# Patient Record
Sex: FEMALE | Race: WHITE | NOT HISPANIC OR LATINO | Employment: OTHER | URBAN - METROPOLITAN AREA
[De-identification: names, ages, dates, MRNs, and addresses within clinical notes are randomized per-mention and may not be internally consistent; named-entity substitution may affect disease eponyms.]

---

## 2017-06-03 ENCOUNTER — HOSPITAL ENCOUNTER (EMERGENCY)
Facility: HOSPITAL | Age: 62
Discharge: HOME | End: 2017-06-03
Attending: EMERGENCY MEDICINE
Payer: MEDICAID

## 2017-06-03 VITALS
OXYGEN SATURATION: 99 % | RESPIRATION RATE: 14 BRPM | WEIGHT: 223.31 LBS | HEIGHT: 63 IN | TEMPERATURE: 99 F | DIASTOLIC BLOOD PRESSURE: 69 MMHG | HEART RATE: 64 BPM | SYSTOLIC BLOOD PRESSURE: 141 MMHG | BODY MASS INDEX: 39.57 KG/M2

## 2017-06-03 DIAGNOSIS — R06.00 DYSPNEA, UNSPECIFIED TYPE: Primary | ICD-10-CM

## 2017-06-03 LAB
ALBUMIN SERPL BCP-MCNC: 3.8 G/DL
ALP SERPL-CCNC: 64 U/L
ALT SERPL W/O P-5'-P-CCNC: 34 U/L
ANION GAP SERPL CALC-SCNC: 11 MMOL/L
AST SERPL-CCNC: 19 U/L
BASOPHILS # BLD AUTO: 0 K/UL
BASOPHILS NFR BLD: 0.4 %
BILIRUB SERPL-MCNC: 0.3 MG/DL
BNP SERPL-MCNC: 28 PG/ML
BUN SERPL-MCNC: 15 MG/DL
CALCIUM SERPL-MCNC: 9.4 MG/DL
CHLORIDE SERPL-SCNC: 103 MMOL/L
CO2 SERPL-SCNC: 28 MMOL/L
CREAT SERPL-MCNC: 0.8 MG/DL
DIFFERENTIAL METHOD: ABNORMAL
EOSINOPHIL # BLD AUTO: 0.1 K/UL
EOSINOPHIL NFR BLD: 1.3 %
ERYTHROCYTE [DISTWIDTH] IN BLOOD BY AUTOMATED COUNT: 14.2 %
EST. GFR  (AFRICAN AMERICAN): >60 ML/MIN/1.73 M^2
EST. GFR  (NON AFRICAN AMERICAN): >60 ML/MIN/1.73 M^2
GLUCOSE SERPL-MCNC: 155 MG/DL
HCT VFR BLD AUTO: 38.7 %
HGB BLD-MCNC: 13.2 G/DL
LYMPHOCYTES # BLD AUTO: 1.5 K/UL
LYMPHOCYTES NFR BLD: 17.9 %
MCH RBC QN AUTO: 30 PG
MCHC RBC AUTO-ENTMCNC: 34 %
MCV RBC AUTO: 88 FL
MONOCYTES # BLD AUTO: 0.7 K/UL
MONOCYTES NFR BLD: 8.1 %
NEUTROPHILS # BLD AUTO: 5.9 K/UL
NEUTROPHILS NFR BLD: 72.3 %
PLATELET # BLD AUTO: 183 K/UL
PMV BLD AUTO: 8.1 FL
POTASSIUM SERPL-SCNC: 3.6 MMOL/L
PROT SERPL-MCNC: 7.3 G/DL
RBC # BLD AUTO: 4.38 M/UL
SODIUM SERPL-SCNC: 142 MMOL/L
TROPONIN I SERPL DL<=0.01 NG/ML-MCNC: 0.01 NG/ML
WBC # BLD AUTO: 8.1 K/UL

## 2017-06-03 PROCEDURE — 36415 COLL VENOUS BLD VENIPUNCTURE: CPT

## 2017-06-03 PROCEDURE — 83880 ASSAY OF NATRIURETIC PEPTIDE: CPT

## 2017-06-03 PROCEDURE — 84484 ASSAY OF TROPONIN QUANT: CPT

## 2017-06-03 PROCEDURE — 99284 EMERGENCY DEPT VISIT MOD MDM: CPT

## 2017-06-03 PROCEDURE — 80053 COMPREHEN METABOLIC PANEL: CPT

## 2017-06-03 PROCEDURE — 85025 COMPLETE CBC W/AUTO DIFF WBC: CPT

## 2017-06-03 PROCEDURE — 93005 ELECTROCARDIOGRAM TRACING: CPT

## 2017-06-03 RX ORDER — AMOXICILLIN AND CLAVULANATE POTASSIUM 875; 125 MG/1; MG/1
1 TABLET, FILM COATED ORAL 2 TIMES DAILY
Qty: 20 TABLET | Refills: 0 | Status: SHIPPED | OUTPATIENT
Start: 2017-06-03 | End: 2017-06-13

## 2017-06-03 NOTE — ED PROVIDER NOTES
"Encounter Date: 6/3/2017    SCRIBE #1 NOTE: Michael MENDOZA, constantino scribing for, and in the presence of, Dr. Renteria.       History     Chief Complaint   Patient presents with    Shortness of Breath     cough    Dizziness     Review of patient's allergies indicates:   Allergen Reactions    Iodinated contrast media - oral and iv dye Itching and Other (See Comments)     Throat itching     Vancomycin analogues Itching    Sulfa (sulfonamide antibiotics) Itching    Pcn [penicillins] Other (See Comments)     Bumps in mouth after taking for a while.      06/03/2017  5:53 PM     Chief Complaint: Dizziness and light-headedness      The patient is a 61 y.o. female with a PMHx of cancer and CAD who is presenting with an acute onset of dizziness and light-headedness that started this morning when she woke up. Associated symptom of nausea. Pt has c/o SOB and a productive cough with an associated 10 lb weight gain since over 2 wks ago. Pt seen by Dr. Vivas 2 wks ago and diagnosed with bronchial PNA based on CXR. She was prescribed a Z Talha initially, which did not provide relief. Pt stated Dr. Vivas's office canceled her f/u appointment and Dr. Vivas called in Biaxin and an inhaler for her. She reported not taking the Biaxin "because she read the side effects." Pt used the inhaler, which provided some relief. Her SOB worsened this morning when she woke up. No fever. No CP. No vomiting or blood in stool. Pt has a past surgical history that includes Cardiac surgery; Hysterectomy; Cholecystectomy; Breast surgery; and Knee cartilage surgery.        The history is provided by the patient.     Past Medical History:   Diagnosis Date    Cancer     Breast    Coronary artery disease      Past Surgical History:   Procedure Laterality Date    BREAST SURGERY      CARDIAC SURGERY      CHOLECYSTECTOMY      HYSTERECTOMY      KNEE CARTILAGE SURGERY       History reviewed. No pertinent family history.  Social History   Substance " Use Topics    Smoking status: Former Smoker     Quit date: 6/3/2005    Smokeless tobacco: Not on file    Alcohol use Not on file     Review of Systems   Constitutional: Positive for unexpected weight change (10 lb weight gain.). Negative for fever.   HENT: Negative for sore throat.    Eyes: Negative for visual disturbance.   Respiratory: Positive for cough and shortness of breath.    Cardiovascular: Negative for chest pain.   Gastrointestinal: Positive for nausea. Negative for blood in stool and vomiting.   Genitourinary: Negative for dysuria.   Musculoskeletal: Negative for back pain.   Skin: Negative for rash.   Neurological: Positive for dizziness and light-headedness.   Hematological: Does not bruise/bleed easily.   Psychiatric/Behavioral: Negative for confusion.       Physical Exam     Initial Vitals [06/03/17 1632]   BP Pulse Resp Temp SpO2   136/67 72 14 98.5 °F (36.9 °C) 97 %     Physical Exam    Nursing note and vitals reviewed.  Constitutional: She appears well-developed and well-nourished. She is not diaphoretic. No distress.   HENT:   Head: Normocephalic and atraumatic.   Eyes: EOM are normal. Pupils are equal, round, and reactive to light.   Neck: Normal range of motion. Neck supple.   Cardiovascular: Normal rate, regular rhythm, normal heart sounds and intact distal pulses.   Pulmonary/Chest: Breath sounds normal. No respiratory distress. She has no wheezes. She has no rhonchi. She has no rales.   Abdominal: Soft. Bowel sounds are normal. There is no tenderness. There is no rebound and no guarding.   Musculoskeletal: Normal range of motion.   Neurological: She is alert and oriented to person, place, and time.   Skin: Skin is warm and dry.   Psychiatric: She has a normal mood and affect. Her behavior is normal. Judgment and thought content normal.         ED Course   Procedures  Labs Reviewed - No data to display          Medical Decision Making:   Initial Assessment:   61-year-old female presented  with a chief complaint of shortness of breath and dizziness.  Differential Diagnosis:   Differential diagnosis includes PNA, CHF, acute MI, and bronchitis.  Clinical Tests:   Lab Tests: Ordered and Reviewed  Radiological Study: Ordered and Reviewed  Medical Tests: Ordered and Reviewed  ED Management:  The patient was emergently evaluated in the ED, her evaluation was significant for a middle-aged female with a normal lung exam.  The patient's EKG showed no acute abnormalities per my independent interpretation.  The patient's x-ray showed no acute abnormalities per my independent interpretation.  The patient's CT scan of her chest showed no acute abnormalities.  The patient's labs showed no acute processes, including a negative troponin.  The etiology patient's dyspnea could be a sinus infection.  She is stable for discharge to home.  She'll be discharged home with by mouth Augmentin and she is to follow-up with her PCP for further care.            Scribe Attestation:   Scribe #1: I performed the above scribed service and the documentation accurately describes the services I performed. I attest to the accuracy of the note.    Attending Attestation:           Physician Attestation for Scribe:  Physician Attestation Statement for Scribe #1: I, Dr. Renteria, reviewed documentation, as scribed by Michael Lima in my presence, and it is both accurate and complete.                 ED Course     Clinical Impression:   The encounter diagnosis was Dyspnea, unspecified type.          Jeremiah Renteria MD  06/03/17 7012

## 2017-07-28 ENCOUNTER — OFFICE VISIT (OUTPATIENT)
Dept: SURGERY | Facility: CLINIC | Age: 62
End: 2017-07-28
Payer: MEDICAID

## 2017-07-28 VITALS
BODY MASS INDEX: 39.51 KG/M2 | DIASTOLIC BLOOD PRESSURE: 69 MMHG | SYSTOLIC BLOOD PRESSURE: 141 MMHG | HEIGHT: 63 IN | WEIGHT: 223 LBS

## 2017-07-28 DIAGNOSIS — K44.9 DIAPHRAGMATIC HERNIA WITHOUT OBSTRUCTION AND WITHOUT GANGRENE: ICD-10-CM

## 2017-07-28 PROCEDURE — 99203 OFFICE O/P NEW LOW 30 MIN: CPT | Mod: ,,, | Performed by: SURGERY

## 2017-07-28 RX ORDER — ALENDRONATE SODIUM 70 MG/1
TABLET ORAL
COMMUNITY
Start: 2017-05-15 | End: 2020-05-11

## 2017-07-28 RX ORDER — ERGOCALCIFEROL 1.25 MG/1
CAPSULE ORAL
COMMUNITY
Start: 2017-05-15 | End: 2020-05-11

## 2017-07-28 RX ORDER — CLARITHROMYCIN 500 MG/1
TABLET, FILM COATED ORAL
COMMUNITY
Start: 2017-05-22 | End: 2018-06-21

## 2017-07-28 RX ORDER — ATORVASTATIN CALCIUM 20 MG/1
TABLET, FILM COATED ORAL
COMMUNITY
Start: 2017-05-15 | End: 2020-05-11

## 2017-07-28 RX ORDER — ALBUTEROL SULFATE 90 UG/1
AEROSOL, METERED RESPIRATORY (INHALATION)
COMMUNITY
Start: 2017-05-22 | End: 2018-11-26 | Stop reason: SDUPTHER

## 2017-07-28 RX ORDER — FLUOCINONIDE 0.5 MG/G
CREAM TOPICAL
COMMUNITY
Start: 2017-05-15 | End: 2018-06-21

## 2017-07-28 RX ORDER — AZITHROMYCIN 250 MG/1
TABLET, FILM COATED ORAL
COMMUNITY
Start: 2017-05-18 | End: 2018-06-21 | Stop reason: ALTCHOICE

## 2017-07-28 RX ORDER — MECLIZINE HCL 12.5 MG 12.5 MG/1
TABLET ORAL
COMMUNITY
Start: 2017-06-05 | End: 2018-06-21

## 2017-07-28 RX ORDER — LISINOPRIL AND HYDROCHLOROTHIAZIDE 12.5; 2 MG/1; MG/1
TABLET ORAL
COMMUNITY
Start: 2017-05-15 | End: 2020-05-11

## 2017-07-28 RX ORDER — LISINOPRIL 20 MG/1
TABLET ORAL
COMMUNITY
Start: 2017-05-15 | End: 2020-05-11

## 2017-07-28 NOTE — PROGRESS NOTES
Subjective:       Patient ID: Stephani Willoughby is a 61 y.o. female.    Chief Complaint: Hernia (Referred by  to evaluate poss hernia)      HPI:  61-year-old female presents for evaluation of Bochdalek hernia. The hernias on the left side. It it contains only fat. This is an incidental finding on a CT scan of the chest which was performed at [facility] on 06/04/2017. Patient was evaluated for cough and shortness of breath at that time. Patient does not have chronic shortness of breath. Patient has no abdominal complaints. Patient was not aware she had any issues up until the scan was done. She was referred here by her primary physician for evaluation.    Past Medical History:   Diagnosis Date    Cancer     Breast    CHF (congestive heart failure)     Coronary artery disease     DM (diabetes mellitus)     GERD (gastroesophageal reflux disease)      Past Surgical History:   Procedure Laterality Date    BREAST LUMPECTOMY Left 2014    BREAST SURGERY      CARDIAC SURGERY      CHOLECYSTECTOMY      CORONARY ARTERY BYPASS GRAFT      EYE SURGERY      HYSTERECTOMY      KNEE CARTILAGE SURGERY       Review of patient's allergies indicates:   Allergen Reactions    Iodinated contrast- oral and iv dye Itching and Other (See Comments)     Throat itching     Vancomycin analogues Itching    Sulfa (sulfonamide antibiotics) Itching    Pcn [penicillins] Other (See Comments)     Bumps in mouth after taking for a while.      Medication List with Changes/Refills   Current Medications    ALENDRONATE (FOSAMAX) 70 MG TABLET        ANASTROZOLE (ARIMIDEX) 1 MG TAB    Take 1 mg by mouth once daily.    ASPIRIN (ECOTRIN) 325 MG EC TABLET    Take 325 mg by mouth once daily.    ATORVASTATIN (LIPITOR) 20 MG TABLET        AZITHROMYCIN (Z-CHANA) 250 MG TABLET        BISACODYL (DULCOLAX) 5 MG EC TABLET    Take 5 mg by mouth daily as needed for Constipation.    CALCIUM CARBONATE (OS-SYBIL) 500 MG CALCIUM (1,250 MG)  TABLET    Take 2 tablets by mouth once daily.    CLARITHROMYCIN (BIAXIN) 500 MG TABLET        ERGOCALCIFEROL (ERGOCALCIFEROL) 50,000 UNIT CAP        FLUOCINONIDE 0.05% (LIDEX) 0.05 % CREAM        FUROSEMIDE (LASIX) 20 MG TABLET    Take 20 mg by mouth as needed.    IBUPROFEN (ADVIL,MOTRIN) 600 MG TABLET    Take 1 tablet (600 mg total) by mouth every 8 (eight) hours as needed for Pain.    LISINOPRIL (PRINIVIL,ZESTRIL) 20 MG TABLET        LISINOPRIL 10 MG TABLET    Take 10 mg by mouth once daily.    LISINOPRIL-HYDROCHLOROTHIAZIDE (PRINZIDE,ZESTORETIC) 20-12.5 MG PER TABLET        LOVASTATIN (MEVACOR) 40 MG TABLET    Take 40 mg by mouth every evening.    MECLIZINE (ANTIVERT) 12.5 MG TABLET        OMEPRAZOLE (PRILOSEC) 20 MG CAPSULE    Take 20 mg by mouth once daily.    ONDANSETRON (ZOFRAN-ODT) 4 MG TBDL    Take 8 mg by mouth every 12 (twelve) hours.    POTASSIUM CHLORIDE (KLOR-CON) 10 MEQ TBSR    Take 20 mEq by mouth once daily.    VENTOLIN HFA 90 MCG/ACTUATION INHALER         Family History   Problem Relation Age of Onset    Heart disease Mother     Alzheimer's disease Mother     Heart disease Father     Diabetes Father     Kidney disease Father     Breast cancer Paternal Aunt      Social History     Social History    Marital status:      Spouse name: N/A    Number of children: N/A    Years of education: N/A     Social History Main Topics    Smoking status: Former Smoker     Quit date: 6/3/2005    Smokeless tobacco: Never Used    Alcohol use No    Drug use: No    Sexual activity: Not Asked     Other Topics Concern    None     Social History Narrative    None         Review of Systems   Constitutional: Negative for appetite change, chills, fever and unexpected weight change.   HENT: Negative for hearing loss, rhinorrhea, sore throat and voice change.    Eyes: Negative for photophobia and visual disturbance.   Respiratory: Positive for shortness of breath. Negative for cough and choking.     Cardiovascular: Negative for chest pain, palpitations and leg swelling.   Gastrointestinal: Negative for abdominal pain, blood in stool, constipation, diarrhea, nausea and vomiting.   Endocrine: Negative for cold intolerance, heat intolerance, polydipsia and polyuria.   Musculoskeletal: Negative for arthralgias, back pain, joint swelling and neck stiffness.   Skin: Negative for color change, pallor and rash.   Neurological: Negative for dizziness, seizures, syncope and headaches.   Hematological: Negative for adenopathy. Does not bruise/bleed easily.   Psychiatric/Behavioral: Negative for agitation, behavioral problems and confusion.       Objective:      Physical Exam   Constitutional: She appears well-developed and well-nourished.  Non-toxic appearance. No distress.   HENT:   Head: Normocephalic and atraumatic. Head is without abrasion and without laceration.   Right Ear: External ear normal.   Left Ear: External ear normal.   Nose: Nose normal.   Mouth/Throat: Oropharynx is clear and moist.   Eyes: EOM are normal. Pupils are equal, round, and reactive to light.   Neck: Trachea normal. No tracheal deviation and normal range of motion present. No thyroid mass and no thyromegaly present.   Cardiovascular: Normal rate and regular rhythm.    Pulmonary/Chest: Effort normal. No accessory muscle usage. No tachypnea. No respiratory distress.   Abdominal: Soft. Normal appearance and bowel sounds are normal. She exhibits no distension and no mass. There is no hepatosplenomegaly. There is no tenderness. There is no tenderness at McBurney's point and negative Porter's sign. No hernia.   Lymphadenopathy:     She has no cervical adenopathy.     She has no axillary adenopathy.        Right: No inguinal adenopathy present.        Left: No inguinal adenopathy present.   Neurological: She is alert. Coordination and gait normal.   Skin: Skin is warm and intact.   Psychiatric: She has a normal mood and affect. Her speech is normal  and behavior is normal.       Assessment/Plan:   Diaphragmatic hernia without obstruction and without gangrene    Small asymptomatic Bochdalek hernia. I'm not sure there is a surgical indication here. I think following this with yearly CT scans of the more appropriate.    Return if symptoms worsen or fail to improve.

## 2017-07-31 PROBLEM — K44.9 DIAPHRAGMATIC HERNIA WITHOUT OBSTRUCTION AND WITHOUT GANGRENE: Status: ACTIVE | Noted: 2017-07-31

## 2018-06-08 ENCOUNTER — TELEPHONE (OUTPATIENT)
Dept: VASCULAR SURGERY | Facility: CLINIC | Age: 63
End: 2018-06-08

## 2018-06-08 NOTE — TELEPHONE ENCOUNTER
----- Message from Viky Patel sent at 6/8/2018  2:31 PM CDT -----  Type: Needs Medical Advice    Who Called:  Patient   Symptoms (please be specific):  n/a  How long has patient had these symptoms:  N/a  Pharmacy name and phone #:  n/a  Best Call Back Number: [phone]  Additional Information: Patient states she was instructed to contact Dr. Raines regarding a possible valve replacement,  States Dr. Aguilera referred her and will be sending records. , she is requesting to speak to nurse prior to scheduling.     Thank you

## 2018-06-08 NOTE — TELEPHONE ENCOUNTER
We have not gotten any information from Dr Mitchell yet. Patient had cardiac cath on 6/6. She is calling to schedule appointment with Dr An but he does not do surgeries in [place]. She scheduled appointment with Dr Cote for 6/21 in [place] for consultation. We will request records from his office

## 2018-06-21 ENCOUNTER — OFFICE VISIT (OUTPATIENT)
Dept: VASCULAR SURGERY | Facility: CLINIC | Age: 63
End: 2018-06-21
Payer: MEDICAID

## 2018-06-21 VITALS
HEART RATE: 71 BPM | SYSTOLIC BLOOD PRESSURE: 129 MMHG | RESPIRATION RATE: 22 BRPM | HEIGHT: 63 IN | BODY MASS INDEX: 40.04 KG/M2 | DIASTOLIC BLOOD PRESSURE: 67 MMHG | WEIGHT: 226 LBS

## 2018-06-21 DIAGNOSIS — I35.0 NONRHEUMATIC AORTIC VALVE STENOSIS: ICD-10-CM

## 2018-06-21 PROCEDURE — 99213 OFFICE O/P EST LOW 20 MIN: CPT | Mod: PBBFAC,PO | Performed by: THORACIC SURGERY (CARDIOTHORACIC VASCULAR SURGERY)

## 2018-06-21 PROCEDURE — 99204 OFFICE O/P NEW MOD 45 MIN: CPT | Mod: S$PBB,,, | Performed by: THORACIC SURGERY (CARDIOTHORACIC VASCULAR SURGERY)

## 2018-06-21 PROCEDURE — 99999 PR PBB SHADOW E&M-EST. PATIENT-LVL III: CPT | Mod: PBBFAC,,, | Performed by: THORACIC SURGERY (CARDIOTHORACIC VASCULAR SURGERY)

## 2018-06-21 RX ORDER — NITROGLYCERIN 0.4 MG/1
0.4 TABLET SUBLINGUAL EVERY 5 MIN PRN
COMMUNITY
End: 2020-05-11

## 2018-07-10 ENCOUNTER — TELEPHONE (OUTPATIENT)
Dept: VASCULAR SURGERY | Facility: CLINIC | Age: 63
End: 2018-07-10

## 2018-07-10 NOTE — TELEPHONE ENCOUNTER
----- Message from Susan Zamudio sent at 7/10/2018  3:52 PM CDT -----  Patient states that she need to speak to you in regards to surgery.  Please call [phone].

## 2018-07-16 ENCOUNTER — TELEPHONE (OUTPATIENT)
Dept: VASCULAR SURGERY | Facility: CLINIC | Age: 63
End: 2018-07-16

## 2018-07-16 NOTE — TELEPHONE ENCOUNTER
Instructed to add Gentamycin 80mg and Cleocin 600 mg IVPB and Bactroban to nares morning of surgery per Dr. Cote.

## 2018-07-16 NOTE — TELEPHONE ENCOUNTER
----- Message from Leopoldo Corona sent at 7/16/2018  3:36 PM CDT -----  Type: Needs Medical Advice    Who Called:  Darwin Bearden/Elisha Richardson Call Back Number: [phone]  Additional Information: Patient has a positive MRSA screen. Please call to advise

## 2018-07-17 ENCOUNTER — OUTSIDE PLACE OF SERVICE (OUTPATIENT)
Dept: ADMINISTRATIVE | Facility: OTHER | Age: 63
End: 2018-07-17
Payer: MEDICAID

## 2018-07-17 PROCEDURE — 33405 REPLACEMENT AORTIC VALVE OPN: CPT | Mod: ,,, | Performed by: THORACIC SURGERY (CARDIOTHORACIC VASCULAR SURGERY)

## 2018-07-25 ENCOUNTER — TELEPHONE (OUTPATIENT)
Dept: VASCULAR SURGERY | Facility: CLINIC | Age: 63
End: 2018-07-25

## 2018-07-25 NOTE — TELEPHONE ENCOUNTER
----- Message from John Meneses sent at 7/25/2018  4:11 PM CDT -----  Contact: Patient  Type:  Sooner Apoointment Request    Caller is requesting a sooner appointment.  Caller declined first available appointment listed below.  Caller will not accept being placed on the waitlist and is requesting a message be sent to doctor.    Name of Caller:  Stephani  When is the first available appointment?  n/a  Symptoms:  2 week post-op  Best Call Back Number:  [phone]  Additional Information:  Would like to be seen at the [place] office.

## 2018-07-30 ENCOUNTER — TELEPHONE (OUTPATIENT)
Dept: VASCULAR SURGERY | Facility: CLINIC | Age: 63
End: 2018-07-30

## 2018-07-30 NOTE — TELEPHONE ENCOUNTER
----- Message from Bozena Clifford sent at 7/30/2018  8:26 AM CDT -----  Contact: Oma/ [facility]  Oma need to speak with nurse in regards to removing pt's logan.  Oma want to know if the pt has to come in for a visit or have home health remove the pt's staples. Please call to advise  Call Back   Thanks

## 2018-07-30 NOTE — TELEPHONE ENCOUNTER
Verbal order to take out chest tube sutures given to Oma(Home Health) at visit this week. Pt has f/u appt with Cardio Ana) on 8/1/18. Pt unable to come to [place] for po appt with Dr. Cote this week.

## 2018-07-31 ENCOUNTER — TELEPHONE (OUTPATIENT)
Dept: VASCULAR SURGERY | Facility: CLINIC | Age: 63
End: 2018-07-31

## 2018-07-31 NOTE — TELEPHONE ENCOUNTER
----- Message from Vanessa Quiles sent at 7/31/2018  3:58 PM CDT -----  Contact: self  Patient need to speak to nurse regarding a prescription for pain medication       Please call to advice [phone] (home)

## 2018-08-02 ENCOUNTER — TELEPHONE (OUTPATIENT)
Dept: VASCULAR SURGERY | Facility: CLINIC | Age: 63
End: 2018-08-02

## 2018-08-02 NOTE — TELEPHONE ENCOUNTER
----- Message from Viky Corral sent at 8/2/2018  8:58 AM CDT -----  Type: Needs Medical Advice    Who Called:  Patient  Best Call Back Number: [phone]  Additional Information: Patient requesting to speak with nurse (only information given)

## 2018-08-02 NOTE — TELEPHONE ENCOUNTER
Written rx for Norco by Dr. Cote to be dropped off for  at [facility] this afternoon. Pt notified.

## 2018-08-02 NOTE — TELEPHONE ENCOUNTER
----- Message from Tali Church sent at 8/2/2018  1:25 PM CDT -----  Contact: self  Patient [phone] is calling to leave another message to speak with Nurse/she would not give me any information/she advised the nurse knows what this is concerning/please call

## 2018-08-13 ENCOUNTER — TELEPHONE (OUTPATIENT)
Dept: VASCULAR SURGERY | Facility: CLINIC | Age: 63
End: 2018-08-13

## 2018-08-13 NOTE — TELEPHONE ENCOUNTER
----- Message from RT Savi sent at 8/13/2018  9:47 AM CDT -----  Contact: Pt    Pt , requesting to schedule a post op appt with Dr. Cote, stated she seen him previously, pt have [employer], thanks.

## 2018-08-29 ENCOUNTER — OFFICE VISIT (OUTPATIENT)
Dept: VASCULAR SURGERY | Facility: CLINIC | Age: 63
End: 2018-08-29
Attending: FAMILY MEDICINE
Payer: MEDICAID

## 2018-08-29 VITALS
BODY MASS INDEX: 37.97 KG/M2 | SYSTOLIC BLOOD PRESSURE: 139 MMHG | WEIGHT: 214.31 LBS | HEIGHT: 63 IN | DIASTOLIC BLOOD PRESSURE: 69 MMHG

## 2018-08-29 DIAGNOSIS — Z95.2 S/P AVR: ICD-10-CM

## 2018-08-29 PROCEDURE — 99024 POSTOP FOLLOW-UP VISIT: CPT | Mod: S$PBB,,, | Performed by: THORACIC SURGERY (CARDIOTHORACIC VASCULAR SURGERY)

## 2018-08-29 PROCEDURE — 99999 PR PBB SHADOW E&M-EST. PATIENT-LVL III: CPT | Mod: PBBFAC,,, | Performed by: THORACIC SURGERY (CARDIOTHORACIC VASCULAR SURGERY)

## 2018-08-29 PROCEDURE — 99213 OFFICE O/P EST LOW 20 MIN: CPT | Mod: PBBFAC | Performed by: THORACIC SURGERY (CARDIOTHORACIC VASCULAR SURGERY)

## 2018-08-29 RX ORDER — METOPROLOL SUCCINATE 50 MG/1
TABLET, EXTENDED RELEASE ORAL
COMMUNITY
Start: 2018-08-21 | End: 2020-05-11

## 2018-08-29 RX ORDER — WARFARIN SODIUM 5 MG/1
TABLET ORAL
COMMUNITY
Start: 2018-08-28 | End: 2020-05-11

## 2018-08-29 RX ORDER — HYDROCODONE BITARTRATE AND ACETAMINOPHEN 10; 325 MG/1; MG/1
TABLET ORAL
COMMUNITY
Start: 2018-08-02 | End: 2018-08-29 | Stop reason: ALTCHOICE

## 2018-08-29 RX ORDER — HYDROCODONE BITARTRATE AND ACETAMINOPHEN 5; 325 MG/1; MG/1
1 TABLET ORAL EVERY 8 HOURS PRN
Qty: 28 TABLET | Refills: 0
Start: 2018-08-29 | End: 2020-05-11

## 2018-08-29 NOTE — PROGRESS NOTES
OFFICE NOTE    This is a 62-year-old lady status post redo sternotomy with aortic valve   replacement.  She had a mechanical valve, which was a 21-mm Carbomedics   prosthesis.  Medicines are noted.  She has regular checks on her INR, which   according to the patient was 3.9.  The last time it was checked.  Surgical   wounds are clean and dry.  There is no evidence of infection.  Her sutures and   clips were all removed.  Her chest is clear.  Heart in a regular rate and rhythm   with artificial valve tones that are regular in nature without a significant   murmur.  At this point, I think she has done well since the procedure.  I will   defer her medical management to the Cardiology Service, but I would anticipate a   fairly good long-term prognosis.      EB/NICOLLE  dd: 08/29/2018 14:47:30 (CDT)  td: 08/30/2018 09:11:43 (CDT)  Doc ID   #[id number]  Job ID #[id number]    CC: Timmy Montemayor MD

## 2018-11-05 ENCOUNTER — TELEPHONE (OUTPATIENT)
Dept: PULMONOLOGY | Facility: CLINIC | Age: 63
End: 2018-11-05

## 2018-11-05 NOTE — TELEPHONE ENCOUNTER
Called and left message that we need to schedule an appointment for her. Patient called back and scheduled.

## 2018-11-05 NOTE — TELEPHONE ENCOUNTER
----- Message from WELLINGTON Figueroa sent at 11/5/2018 10:18 AM CST -----  Patient is medicaid however she was seen in the hospital by Dr. Cr please see if she can come today as an add on so she can get PFTS as well this afternoon  ----- Message -----  From: Solomon Gauthier MA  Sent: 11/5/2018  10:16 AM  To: WELLINGTON Figueroa        ----- Message -----  From: Marisela Guillermo  Sent: 11/2/2018  11:59 AM  To: Tayo Skinner Staff    REFERRAL FROM DR. EASLEY, 11/02/18

## 2018-11-26 ENCOUNTER — OFFICE VISIT (OUTPATIENT)
Dept: PULMONOLOGY | Facility: CLINIC | Age: 63
End: 2018-11-26
Payer: MEDICAID

## 2018-11-26 VITALS
WEIGHT: 220 LBS | DIASTOLIC BLOOD PRESSURE: 80 MMHG | SYSTOLIC BLOOD PRESSURE: 150 MMHG | BODY MASS INDEX: 38.98 KG/M2 | HEIGHT: 63 IN | OXYGEN SATURATION: 92 % | HEART RATE: 80 BPM

## 2018-11-26 DIAGNOSIS — J18.9 PNEUMONIA OF LEFT LOWER LOBE DUE TO INFECTIOUS ORGANISM: ICD-10-CM

## 2018-11-26 DIAGNOSIS — J98.11 ATELECTASIS: ICD-10-CM

## 2018-11-26 DIAGNOSIS — R06.00 DYSPNEA, UNSPECIFIED TYPE: ICD-10-CM

## 2018-11-26 DIAGNOSIS — J45.40 MODERATE PERSISTENT ASTHMA WITHOUT COMPLICATION: ICD-10-CM

## 2018-11-26 PROBLEM — J45.909 MODERATE ASTHMA: Status: ACTIVE | Noted: 2018-11-26

## 2018-11-26 PROCEDURE — 99214 OFFICE O/P EST MOD 30 MIN: CPT | Mod: ,,, | Performed by: NURSE PRACTITIONER

## 2018-11-26 RX ORDER — ALBUTEROL SULFATE 90 UG/1
2 AEROSOL, METERED RESPIRATORY (INHALATION) EVERY 6 HOURS PRN
Qty: 18 G | Refills: 6 | Status: SHIPPED | OUTPATIENT
Start: 2018-11-26 | End: 2020-05-11

## 2018-11-26 RX ORDER — FLUTICASONE FUROATE AND VILANTEROL 100; 25 UG/1; UG/1
1 POWDER RESPIRATORY (INHALATION) DAILY
Qty: 1 EACH | Refills: 3 | Status: SHIPPED | OUTPATIENT
Start: 2018-11-26 | End: 2019-05-26 | Stop reason: SDUPTHER

## 2018-11-26 NOTE — PATIENT INSTRUCTIONS
Understanding Asthma    Asthma causes swelling and narrowing of the airways in your lungs. Medical experts are not exactly sure what causes asthma. It is believed to be caused by a mix of inherited and environmental factors.  Healthy lungs  Inside the lungs there are branching airways made of stretchy tissue. Each airway is wrapped with bands of muscle. The airways become more narrow as they go deeper into the lungs. The smallest airways end in clusters of tiny balloon-like air sacs (alveoli). These clusters are surrounded by blood vessels. When you breathe in (inhale), air enters the lungs. It travels down through the airways until it reaches the air sacs. When you breathe out (exhale), air travels up through the airways and out of the lungs. The airways produce mucus that traps particles you breathe in. Normally, the mucus is then swept out of the lungs by tiny hairs (cilia) that line the airways. The mucus is swallowed or coughed up.  What the lungs do  The air you inhale contains oxygen. When oxygen reaches the air sacs, it passes into the blood vessels surrounding the sacs. Your blood then delivers oxygen to all of your cells. As you exhale, carbon dioxide is removed in a similar way from the blood in the air sacs, and from the body.  When you have asthma  People with asthma have very sensitive airways. This means the airways react to certain things called triggers (such as pollen, dust, or smoke) and become swollen and narrowed. Inflammation makes the airways swollen and narrowed. This is a long-lasting (chronic) problem. The airways may not always be narrowed enough to notice breathing problems.  Symptoms of chronic inflammation:   · Coughing  · Chest tightness  · Shortness of breath  · Wheezing (a whistling noise, especially when breathing out)  · Low energy or feeling tired  In some people, over time chronic mild inflammation can lead to lasting (permanent) scarring of airways and loss of lung  function.  Moderate flare-ups  When sensitive airways are irritated by a trigger, the muscles around the airways tighten. The lining of the airways swells. Thick, sticky mucus increases and partly clogs the airways. All of this makes you work harder to keep breathing.  Symptoms of moderate flare-ups:  · Coughing, especially at night  · Getting tired or out of breath easily  · Wheezing  · Chest tightness  · Faster breathing when at rest  Severe flare-ups  Severe flare-ups are life-threatening. In a severe flare-up, the muscle tightening, swelling, and mucus production are even worse. Its very hard to breathe. Your body can't get enough oxygen and can't remove carbon dioxide. Waste gas is trapped in the alveoli, and gas exchange cant occur. The body is not getting enough oxygen. Without oxygen, body tissues, especially brain tissue, begin to get damaged. If this goes on for long, it can lead to severe brain damage or death.  Call 911 (or have someone call for you) if you have any of these symptoms and they are not relieved right away by taking your quick-relief medicine as prescribed:  · Severe trouble breathing  · Too short of breath to talk or walk  · Lips or fingers turning blue  · Feeling lightheaded or dizzy, as though you are about to pass out  · Peak flow less than 50% of your personal best, if you use peak flow monitoring  Asthma is a long-term condition. So its important to work with your healthcare provider to manage it. If you smoke, get help to quit. Know your triggers and figure out how to avoid them. Its also very important to take your medicines as directed. That means taking them even when you feel good.  Date Last Reviewed: 12/1/2016  © 6125-7663 [employer]. [address] [place] [address], [place] [address]. All rights reserved. This information is not intended as a substitute for professional medical care. Always follow your healthcare professional's instructions.       Ventolin 2 puffs as  needed every 4-6 hours for shortness of breath  Breo 100 mcg 1 puff daily, rinse after you use it   flonase 2 puffs to each nostril daily  Allegra 180mg daily  Bring CPAP card so we can download   PCP in [facility] network are Dr. Hinojosa, Dr. Clark, Dr. James, Dr. Gauthier

## 2018-11-26 NOTE — PROGRESS NOTES
SUBJECTIVE:    Patient ID: Stephani Willoughby is a 63 y.o. female.    Chief Complaint: Shortness of Breath (hospital follow up)    HPI   Patient here today for hospital follow up. She had an aortic valve replacement in July and developed pneumonia.  Follow up chest xray in October showed atelectasis vs scarring in mid left lung.  She is still short of breath with exertion.  She stopped cardiac rehab because she did not feel it was helping her breathing.  She has Ventolin that she uses occasionally.  She suffers with bronchitis on and off. She wears her CPAP most days.   Her PFTs show moderate obstruction with good response in the small airway.  She was approved to have gastric sleeve at [facility].    Past Medical History:   Diagnosis Date    Anemia     Breast cancer     Cancer     Breast    CHF (congestive heart failure)     Coarctation of aorta     Coronary artery disease     Diastolic heart failure     DM (diabetes mellitus)     GERD (gastroesophageal reflux disease)     Hyperlipidemia     ARNAUD (obstructive sleep apnea)     Pickwickian syndrome     Pneumonia      Past Surgical History:   Procedure Laterality Date    AORTIC VALVE REPLACEMENT      BREAST LUMPECTOMY Left 2014    BREAST SURGERY      CARDIAC SURGERY      CHOLECYSTECTOMY      CORONARY ARTERY BYPASS GRAFT      x1 vessel    EYE SURGERY      HYSTERECTOMY      KNEE CARTILAGE SURGERY       Family History   Problem Relation Age of Onset    Heart disease Mother     Alzheimer's disease Mother     Heart disease Father     Diabetes Father     Kidney disease Father     Breast cancer Paternal Aunt         Social History:   Marital Status:   Occupation: , retired LPN  Alcohol History:  reports that she does not drink alcohol.  Tobacco History:  reports that she quit smoking about 13 years ago. Her smoking use included cigarettes. She started smoking about 33 years ago. She has a 60.00 pack-year smoking  history. she has never used smokeless tobacco.  Drug History:  reports that she does not use drugs.    Review of patient's allergies indicates:   Allergen Reactions    Iodinated contrast- oral and iv dye Itching and Other (See Comments)     Throat itching     Vancomycin analogues Itching    Sulfa (sulfonamide antibiotics) Itching    Codeine Nausea Only    Avelox [moxifloxacin] Palpitations    Pcn [penicillins] Other (See Comments)     Bumps in mouth after taking for a while.        Current Outpatient Medications   Medication Sig Dispense Refill    alendronate (FOSAMAX) 70 MG tablet       anastrozole (ARIMIDEX) 1 mg Tab Take 1 mg by mouth once daily.      atorvastatin (LIPITOR) 20 MG tablet       calcium carbonate (OS-SYBIL) 500 mg calcium (1,250 mg) tablet Take 2 tablets by mouth once daily.      cyanocobalamin, vitamin B-12, (VITAMIN B-12) 50 mcg tablet Take 50 mcg by mouth once daily.      ergocalciferol (ERGOCALCIFEROL) 50,000 unit Cap       furosemide (LASIX) 20 MG tablet Take 20 mg by mouth as needed.      JANTOVEN 5 mg tablet       lisinopril (PRINIVIL,ZESTRIL) 20 MG tablet       lisinopril-hydrochlorothiazide (PRINZIDE,ZESTORETIC) 20-12.5 mg per tablet       metoprolol succinate (TOPROL-XL) 50 MG 24 hr tablet       nitroGLYCERIN (NITROSTAT) 0.4 MG SL tablet Place 0.4 mg under the tongue every 5 (five) minutes as needed for Chest pain.      omeprazole (PRILOSEC) 20 MG capsule Take 20 mg by mouth once daily.      ondansetron (ZOFRAN-ODT) 4 MG TbDL Take 8 mg by mouth every 12 (twelve) hours.      potassium chloride (KLOR-CON) 10 MEQ TbSR Take 20 mEq by mouth once daily.      triamcinolone acetonide (NASACORT NASL) by Nasal route.      VENTOLIN HFA 90 mcg/actuation inhaler Inhale 2 puffs into the lungs every 6 (six) hours as needed for Wheezing. 18 g 6    aspirin (ECOTRIN) 325 MG EC tablet Take 325 mg by mouth once daily.      bisacodyl (DULCOLAX) 5 mg EC tablet Take 5 mg by mouth daily as  "needed for Constipation.      fluticasone-vilanterol (BREO ELLIPTA) 100-25 mcg/dose diskus inhaler Inhale 1 puff into the lungs once daily. Controller Rinse after you use it 1 each 3    HYDROcodone-acetaminophen (NORCO) 5-325 mg per tablet Take 1 tablet by mouth every 8 (eight) hours as needed for Pain. 28 tablet 0     No current facility-administered medications for this visit.        Last PFT: 11/26/2018  Last Chest xray: 10/01/2018    Review of Systems  General: Feeling Well.  Eyes: Vision is good.  ENT:  Sinus congestion.   Heart:: No chest pain or palpitations.  Lungs: dyspnea with any type of exertion   GI: No Nausea, vomiting, constipation, diarrhea, or reflux.  : No dysuria, hesitancy, or nocturia.  Musculoskeletal: No joint pain or myalgias.  Skin: No lesions or rashes.  Neuro: No headaches or neuropathy.  Lymph: No edema or adenopathy.  Psych: No anxiety or depression.  Endo: weight gain     OBJECTIVE:      BP (!) 150/80 (BP Location: Left arm, Patient Position: Sitting)   Pulse 80   Ht 5' 3" (1.6 m)   Wt 99.8 kg (220 lb)   SpO2 (!) 92%   BMI 38.97 kg/m²     Physical Exam  GENERAL: Older patient in no distress.  HEENT: Pupils equal and reactive. Extraocular movements intact. Nose intact.      Pharynx moist.  NECK: Supple.   HEART: Regular rate and rhythm. No murmur or gallop auscultated.  LUNGS: faint expiratory wheeze to bilateral upper lobes   ABDOMEN: Bowel sounds present. Non-tender, no masses palpated.  EXTREMITIES: Normal muscle tone and joint movement, no cyanosis or clubbing.   LYMPHATICS: No adenopathy palpated, no edema.  SKIN: Dry, intact, no lesions.   NEURO: Cranial nerves II-XII intact. Motor strength 5/5 bilaterally, upper and lower extremities.  PSYCH: Appropriate affect.    Laura Gutierres NP served in the capacity as a "scribe" for this patient encounter.  A "face to face" encounter occurred with Dr. Cr on this date  The treatment plan and medical decision making is " outlined below:         Assessment:       1. Dyspnea, unspecified type    2. Pneumonia of left lower lobe due to infectious organism    3. Atelectasis    4. Moderate persistent asthma without complication          Plan:       Dyspnea, unspecified type    Pneumonia of left lower lobe due to infectious organism    Atelectasis    Moderate persistent asthma without complication    Other orders  -     fluticasone-vilanterol (BREO ELLIPTA) 100-25 mcg/dose diskus inhaler; Inhale 1 puff into the lungs once daily. Controller Rinse after you use it  Dispense: 1 each; Refill: 3  -     VENTOLIN HFA 90 mcg/actuation inhaler; Inhale 2 puffs into the lungs every 6 (six) hours as needed for Wheezing.  Dispense: 18 g; Refill: 6      Ventolin 2 puffs as needed every 4-6 hours for shortness of breath  Breo 100 mcg 1 puff daily, rinse after you use it   flonase 2 puffs to each nostril daily  Allegra 180mg daily  Bring CPAP card so we can download   PCP in [facility] network are Dr. Hinojosa, Dr. Clark, Dr. James, Dr. Gauthier       Follow-up if symptoms worsen or fail to improve.

## 2018-11-27 ENCOUNTER — DOCUMENTATION ONLY (OUTPATIENT)
Dept: PULMONOLOGY | Facility: CLINIC | Age: 63
End: 2018-11-27

## 2018-11-27 NOTE — PROGRESS NOTES
Susanne Cr M.D., F.C.C.P.  Pulmonary & Critical Care Medicine    [address], [address]  [place] [address]  [phone]      PFT's / Spirometry Results    Patient Name: Stephani Willoughby  YOB: 1955    Date of Study: 11/26/18    Spirometry: No obstruction    Lung Volume: Moderate restriction    Diffusion Capacity:  Moderate diffusion defect     Response to Bronchodilators: good response in small airways    Comments:     Susanne Cr MD      This note was electronically signed by Susanne Cr MD

## 2019-01-03 ENCOUNTER — TELEPHONE (OUTPATIENT)
Dept: PULMONOLOGY | Facility: CLINIC | Age: 64
End: 2019-01-03

## 2019-01-03 NOTE — TELEPHONE ENCOUNTER
Received notice for patients Breo from insurance company.  They have denied and will not cover because we have to do step method.  Please let the patient know that we will send Arnuity to her pharmacy, it is one puff a daily, rinse after.  She needs to call if she experiences any worsening of asthmatic symptoms so we can document for insurance to approve.

## 2019-05-26 RX ORDER — FLUTICASONE FUROATE AND VILANTEROL TRIFENATATE 100; 25 UG/1; UG/1
POWDER RESPIRATORY (INHALATION)
Qty: 30 EACH | Refills: 2 | Status: SHIPPED | OUTPATIENT
Start: 2019-05-26 | End: 2020-05-11

## 2019-05-29 RX ORDER — BUDESONIDE AND FORMOTEROL FUMARATE DIHYDRATE 160; 4.5 UG/1; UG/1
2 AEROSOL RESPIRATORY (INHALATION) EVERY 12 HOURS
Qty: 1 INHALER | Refills: 3 | Status: SHIPPED | OUTPATIENT
Start: 2019-05-29 | End: 2020-05-11

## 2019-10-14 ENCOUNTER — HOSPITAL ENCOUNTER (OUTPATIENT)
Dept: RADIOLOGY | Facility: HOSPITAL | Age: 64
Discharge: HOME | End: 2019-10-14
Attending: INTERNAL MEDICINE
Payer: MEDICAID

## 2019-10-14 DIAGNOSIS — R05.9 COUGH: Primary | ICD-10-CM

## 2019-10-14 DIAGNOSIS — R05.9 COUGH: ICD-10-CM

## 2019-10-14 PROCEDURE — 71046 X-RAY EXAM CHEST 2 VIEWS: CPT | Mod: TC,PO

## 2019-12-24 ENCOUNTER — CLINICAL SUPPORT (OUTPATIENT)
Dept: URGENT CARE | Facility: CLINIC | Age: 64
End: 2019-12-24
Payer: MEDICAID

## 2019-12-24 VITALS
RESPIRATION RATE: 14 BRPM | SYSTOLIC BLOOD PRESSURE: 108 MMHG | HEART RATE: 63 BPM | WEIGHT: 181.38 LBS | DIASTOLIC BLOOD PRESSURE: 65 MMHG | OXYGEN SATURATION: 97 % | BODY MASS INDEX: 32.13 KG/M2 | TEMPERATURE: 98 F

## 2019-12-24 DIAGNOSIS — J40 BRONCHITIS: Primary | ICD-10-CM

## 2019-12-24 LAB
CTP QC/QA: YES
FLUAV AG NPH QL: NEGATIVE
FLUBV AG NPH QL: NEGATIVE

## 2019-12-24 PROCEDURE — 71046 PR XRAY, CHEST, 2 VIEWS: ICD-10-PCS | Mod: S$GLB,,, | Performed by: NURSE PRACTITIONER

## 2019-12-24 PROCEDURE — 99204 PR OFFICE/OUTPT VISIT, NEW, LEVL IV, 45-59 MIN: ICD-10-PCS | Mod: 25,S$GLB,, | Performed by: NURSE PRACTITIONER

## 2019-12-24 PROCEDURE — 71046 X-RAY EXAM CHEST 2 VIEWS: CPT | Mod: S$GLB,,, | Performed by: NURSE PRACTITIONER

## 2019-12-24 PROCEDURE — 87804 INFLUENZA ASSAY W/OPTIC: CPT | Mod: QW,,, | Performed by: NURSE PRACTITIONER

## 2019-12-24 PROCEDURE — 87804 POCT INFLUENZA A/B: ICD-10-PCS | Mod: 59,QW,, | Performed by: NURSE PRACTITIONER

## 2019-12-24 PROCEDURE — 99204 OFFICE O/P NEW MOD 45 MIN: CPT | Mod: 25,S$GLB,, | Performed by: NURSE PRACTITIONER

## 2019-12-24 RX ORDER — AZITHROMYCIN 250 MG/1
TABLET, FILM COATED ORAL
Qty: 6 TABLET | Refills: 0 | Status: SHIPPED | OUTPATIENT
Start: 2019-12-24 | End: 2020-05-11

## 2019-12-24 RX ORDER — DEXAMETHASONE SODIUM PHOSPHATE 4 MG/ML
8 INJECTION, SOLUTION INTRA-ARTICULAR; INTRALESIONAL; INTRAMUSCULAR; INTRAVENOUS; SOFT TISSUE
Status: COMPLETED | OUTPATIENT
Start: 2019-12-24 | End: 2019-12-24

## 2019-12-24 RX ORDER — FERROUS SULFATE 325(65) MG
325 TABLET, DELAYED RELEASE (ENTERIC COATED) ORAL
COMMUNITY
End: 2020-05-11

## 2019-12-24 RX ORDER — PROTEIN SUPPLEMENT
POWDER (GRAM) ORAL
COMMUNITY
End: 2020-05-11

## 2019-12-24 RX ORDER — PREDNISONE 20 MG/1
20 TABLET ORAL 2 TIMES DAILY
Qty: 10 TABLET | Refills: 0 | Status: SHIPPED | OUTPATIENT
Start: 2019-12-24 | End: 2019-12-29

## 2019-12-24 RX ADMIN — DEXAMETHASONE SODIUM PHOSPHATE 8 MG: 4 INJECTION, SOLUTION INTRA-ARTICULAR; INTRALESIONAL; INTRAMUSCULAR; INTRAVENOUS; SOFT TISSUE at 03:12

## 2019-12-24 NOTE — PROGRESS NOTES
Subjective:       Patient ID: Stephani Willoughby is a 64 y.o. female.    Vitals:  weight is 82.3 kg (181 lb 6.4 oz). Her temperature is 98.4 °F (36.9 °C). Her blood pressure is 108/65 and her pulse is 63. Her respiration is 14 and oxygen saturation is 97%.     Chief Complaint: Cough    Pt presents with cough x 1 week with thick sputum production. Pt reports int blood tinged sputum. She denies cp or sob. She reports fatigue but denies fever. She denies night sweats. She has lost wgt but this is secondary to gastric bypass surgery.     Cough   This is a new problem. The current episode started in the past 7 days. The problem has been unchanged. The cough is productive of sputum. Associated symptoms include hemoptysis. Pertinent negatives include no chills, ear pain, eye redness, fever, myalgias, rash, sore throat, shortness of breath or wheezing. Associated symptoms comments: Sweats and coughing spells . Nothing aggravates the symptoms. She has tried nothing for the symptoms. The treatment provided no relief.       Constitution: Positive for fatigue. Negative for chills, sweating and fever.   HENT: Negative for ear pain, congestion, sinus pain, sinus pressure, sore throat and voice change.    Neck: Negative for painful lymph nodes.   Eyes: Negative for eye redness.   Respiratory: Positive for cough, sputum production and bloody sputum. Negative for chest tightness, COPD, shortness of breath, stridor, wheezing and asthma.    Gastrointestinal: Negative for nausea and vomiting.   Musculoskeletal: Negative for muscle ache.   Skin: Negative for rash.   Allergic/Immunologic: Negative for seasonal allergies and asthma.   Hematologic/Lymphatic: Negative for swollen lymph nodes.       Objective:      Physical Exam   Constitutional: She is oriented to person, place, and time. She appears well-developed and well-nourished. She is cooperative.  Non-toxic appearance. She does not have a sickly appearance. She does not appear  ill. No distress.   HENT:   Head: Normocephalic and atraumatic.   Right Ear: Hearing, tympanic membrane, external ear and ear canal normal.   Left Ear: Hearing, tympanic membrane, external ear and ear canal normal.   Nose: Nose normal. No mucosal edema, rhinorrhea or nasal deformity. No epistaxis. Right sinus exhibits no maxillary sinus tenderness and no frontal sinus tenderness. Left sinus exhibits no maxillary sinus tenderness and no frontal sinus tenderness.   Mouth/Throat: Uvula is midline, oropharynx is clear and moist and mucous membranes are normal. No trismus in the jaw. Normal dentition. No uvula swelling. No oropharyngeal exudate, posterior oropharyngeal edema or posterior oropharyngeal erythema.   Eyes: Conjunctivae and lids are normal. No scleral icterus.   Neck: Trachea normal, full passive range of motion without pain and phonation normal. Neck supple. No neck rigidity. No edema and no erythema present.   Cardiovascular: Normal rate, regular rhythm, normal heart sounds, intact distal pulses and normal pulses.   Pulmonary/Chest: Effort normal and breath sounds normal. No stridor. No respiratory distress. She has no decreased breath sounds. She has no wheezes. She has no rhonchi. She has no rales. She exhibits no tenderness.   Abdominal: Normal appearance.   Musculoskeletal: Normal range of motion. She exhibits no edema or deformity.   Neurological: She is alert and oriented to person, place, and time. She exhibits normal muscle tone. Coordination normal.   Skin: Skin is warm, dry, intact, not diaphoretic and not pale.   Psychiatric: She has a normal mood and affect. Her speech is normal and behavior is normal. Judgment and thought content normal. Cognition and memory are normal.   Nursing note and vitals reviewed.        Assessment:       1. Bronchitis        Plan:         Bronchitis  -     POCT Influenza A/B  -     XR CHEST PA AND LATERAL; Future; Expected date: 12/24/2019    Other orders  -      dexamethasone injection 8 mg  -     azithromycin (Z-CHANA) 250 MG tablet; Take 2 tablets by mouth on day 1; Take 1 tablet by mouth on days 2-5  Dispense: 6 tablet; Refill: 0  -     predniSONE (DELTASONE) 20 MG tablet; Take 1 tablet (20 mg total) by mouth 2 (two) times daily. for 5 days  Dispense: 10 tablet; Refill: 0    cxr - suspicion for nodules vs fibrosis vs bronchitis - official read pending    Pt advised will call with xray report reading. Pt advised to call her PCP to schedule CT of chest

## 2019-12-24 NOTE — PATIENT INSTRUCTIONS
What Is Acute Bronchitis?  Acute bronchitis is when the airways in your lungs (bronchial tubes) become red and swollen (inflamed). It is usually caused by a viral infection. But it can also occur because of a bacteria or allergen. Symptoms include a cough that produces yellow or greenish mucus and can last for days or sometimes weeks.  Inside healthy lungs    Air travels in and out of the lungs through the airways. The linings of these airways produce sticky mucus. This mucus traps particles that enter the lungs. Tiny structures called cilia then sweep the particles out of the airways.     Healthy airway: Airways are normally open. Air moves in and out easily.      Healthy cilia: Tiny, hairlike cilia sweep mucus and particles up and out of the airways.   Lungs with bronchitis  Bronchitis often occurs with a cold or the flu virus. The airways become inflamed (red and swollen). There is a deep hacking cough from the extra mucus. Other symptoms may include:  · Wheezing  · Chest discomfort  · Shortness of breath  · Mild fever  A second infection, this time due to bacteria, may then occur. And airways irritated by allergens or smoke are more likely to get infected.        Inflamed airway: Inflammation and extra mucus narrow the airway, causing shortness of breath.      Impaired cilia: Extra mucus impairs cilia, causing congestion and wheezing. Smoking makes the problem worse.   Making a diagnosis  A physical exam, health history, and certain tests help your healthcare provider make the diagnosis.  Health history  Your healthcare provider will ask you about your symptoms.  The exam  Your provider listens to your chest for signs of congestion. He or she may also check your ears, nose, and throat.  Possible tests  · A sputum test for bacteria. This requires a sample of mucus from your lungs.  · A nasal or throat swab. This tests to see if you have a bacterial infection.  · A chest X-ray. This is done if your healthcare  provider thinks you have pneumonia.  · Tests to check for an underlying condition. Other tests may be done to check for things such as allergies, asthma, or COPD (chronic obstructive pulmonary disease). You may need to see a specialist for more lung function testing.  Treating a cough  The main treatment for bronchitis is easing symptoms. Avoiding smoke, allergens, and other things that trigger coughing can often help. If the infection is bacterial, you may be given antibiotics. During the illness, it's important to get plenty of sleep. To ease symptoms:  · Dont smoke. Also avoid secondhand smoke.  · Use a humidifier. Or try breathing in steam from a hot shower. This may help loosen mucus.  · Drink a lot of water and juice. They can soothe the throat and may help thin mucus.  · Sit up or use extra pillows when in bed. This helps to lessen coughing and congestion.  · Ask your provider about using medicine. Ask about using cough medicine, pain and fever medicine, or a decongestant.  Antibiotics  Most cases of bronchitis are caused by cold or flu viruses. They dont need antibiotics to treat them, even if your mucus is thick and green or yellow. Antibiotics dont treat viral illness and antibiotics have not been shown to have any benefit in cases of acute bronchitis. Taking antibiotics when they are not needed increases your risk of getting an infection later that is antibiotic-resistant. Antibiotics can also cause severe cases of diarrhea that require other antibiotics to treat.  It is important that you accept your healthcare provider's opinion to not use antibiotics. Your provider will prescribe antibiotics if the infection is caused by bacteria. If they are prescribed:  · Take all of the medicine. Take the medicine until it is used up, even if symptoms have improved. If you dont, the bronchitis may come back.  · Take the medicines as directed. For instance, some medicines should be taken with food.  · Ask about  side effects. Ask your provider or pharmacist what side effects are common, and what to do about them.  Follow-up care  You should see your provider again in 2 to 3 weeks. By this time, symptoms should have improved. An infection that lasts longer may mean you have a more serious problem.  Prevention  · Avoid tobacco smoke. If you smoke, quit. Stay away from smoky places. Ask friends and family not to smoke around you, or in your home or car.  · Get checked for allergies.  · Ask your provider about getting a yearly flu shot. Also ask about pneumococcal or pneumonia shots.  · Wash your hands often. This helps reduce the chance of picking up viruses that cause colds and flu.  Call your healthcare provider if:  · Symptoms worsen, or you have new symptoms  · Breathing problems worsen or  become severe  · Symptoms dont get better within a week, or within 3 days of taking antibiotics   Date Last Reviewed: 2/1/2017  © 9752-6329 [employer] [place]. [address] [place] [address], [place] [address]. All rights reserved. This information is not intended as a substitute for professional medical care. Always follow your healthcare professional's instructions.

## 2020-02-18 ENCOUNTER — CLINICAL SUPPORT (OUTPATIENT)
Dept: URGENT CARE | Facility: CLINIC | Age: 65
End: 2020-02-18
Payer: MEDICAID

## 2020-02-18 VITALS
RESPIRATION RATE: 18 BRPM | SYSTOLIC BLOOD PRESSURE: 139 MMHG | DIASTOLIC BLOOD PRESSURE: 58 MMHG | OXYGEN SATURATION: 97 % | HEART RATE: 74 BPM | TEMPERATURE: 100 F | WEIGHT: 170 LBS | HEIGHT: 63 IN | BODY MASS INDEX: 30.12 KG/M2

## 2020-02-18 DIAGNOSIS — B34.9 VIRAL ILLNESS: Primary | ICD-10-CM

## 2020-02-18 DIAGNOSIS — J01.90 ACUTE NON-RECURRENT SINUSITIS, UNSPECIFIED LOCATION: ICD-10-CM

## 2020-02-18 DIAGNOSIS — R68.89 FLU-LIKE SYMPTOMS: ICD-10-CM

## 2020-02-18 PROCEDURE — 99214 OFFICE O/P EST MOD 30 MIN: CPT | Mod: S$GLB,,, | Performed by: NURSE PRACTITIONER

## 2020-02-18 PROCEDURE — 99214 PR OFFICE/OUTPT VISIT, EST, LEVL IV, 30-39 MIN: ICD-10-PCS | Mod: S$GLB,,, | Performed by: NURSE PRACTITIONER

## 2020-02-18 RX ORDER — FLUTICASONE PROPIONATE 50 MCG
2 SPRAY, SUSPENSION (ML) NASAL 2 TIMES DAILY
Qty: 15.8 ML | Refills: 0 | Status: SHIPPED | OUTPATIENT
Start: 2020-02-18 | End: 2020-05-11

## 2020-02-18 RX ORDER — DEXAMETHASONE SODIUM PHOSPHATE 4 MG/ML
8 INJECTION, SOLUTION INTRA-ARTICULAR; INTRALESIONAL; INTRAMUSCULAR; INTRAVENOUS; SOFT TISSUE
Status: COMPLETED | OUTPATIENT
Start: 2020-02-18 | End: 2020-02-18

## 2020-02-18 RX ORDER — PREDNISONE 20 MG/1
20 TABLET ORAL 2 TIMES DAILY
Qty: 10 TABLET | Refills: 0 | Status: SHIPPED | OUTPATIENT
Start: 2020-02-18 | End: 2020-02-23

## 2020-02-18 RX ORDER — OSELTAMIVIR PHOSPHATE 75 MG/1
75 CAPSULE ORAL 2 TIMES DAILY
Qty: 10 CAPSULE | Refills: 0 | Status: SHIPPED | OUTPATIENT
Start: 2020-02-18 | End: 2020-02-23

## 2020-02-18 RX ORDER — CETIRIZINE HYDROCHLORIDE 10 MG/1
10 TABLET ORAL DAILY
Qty: 30 TABLET | Refills: 2 | Status: SHIPPED | OUTPATIENT
Start: 2020-02-18 | End: 2020-05-11

## 2020-02-18 RX ADMIN — DEXAMETHASONE SODIUM PHOSPHATE 8 MG: 4 INJECTION, SOLUTION INTRA-ARTICULAR; INTRALESIONAL; INTRAMUSCULAR; INTRAVENOUS; SOFT TISSUE at 02:02

## 2020-02-18 NOTE — PROGRESS NOTES
"Subjective:       Patient ID: Stephani Willoughby is a 64 y.o. female.    Vitals:  height is 5' 3" (1.6 m) and weight is 77.1 kg (170 lb). Her temperature is 100.3 °F (37.9 °C). Her blood pressure is 139/58 (abnormal) and her pulse is 74. Her respiration is 18 and oxygen saturation is 97%.     Chief Complaint: Sinus Problem    Sinus Problem   The current episode started yesterday. The maximum temperature recorded prior to her arrival was 101 - 101.9 F. Associated symptoms include congestion, coughing, headaches, sinus pressure and a sore throat. Pertinent negatives include no chills or shortness of breath. (Body aches ) Past treatments include acetaminophen. The treatment provided no relief.       Constitution: Positive for fatigue. Negative for chills and fever.   HENT: Positive for congestion, postnasal drip, sinus pain, sinus pressure and sore throat.    Neck: Negative for painful lymph nodes.   Cardiovascular: Negative for chest pain and leg swelling.   Eyes: Negative for double vision and blurred vision.   Respiratory: Positive for cough. Negative for shortness of breath.    Gastrointestinal: Negative for nausea, vomiting and diarrhea.   Genitourinary: Negative for dysuria, frequency, urgency and history of kidney stones.   Musculoskeletal: Negative for joint pain, joint swelling, muscle cramps and muscle ache.   Skin: Negative for color change, pale, rash and bruising.   Allergic/Immunologic: Negative for seasonal allergies.   Neurological: Positive for headaches. Negative for dizziness, history of vertigo, light-headedness and passing out.   Hematologic/Lymphatic: Negative for swollen lymph nodes.   Psychiatric/Behavioral: Negative for nervous/anxious, sleep disturbance and depression. The patient is not nervous/anxious.        Objective:      Physical Exam   Constitutional: She is oriented to person, place, and time. She appears well-developed and well-nourished. She is cooperative.  Non-toxic " appearance. She does not appear ill. No distress.   HENT:   Head: Normocephalic and atraumatic.   Right Ear: Hearing, external ear and ear canal normal. A middle ear effusion is present.   Left Ear: Hearing, external ear and ear canal normal. A middle ear effusion is present.   Nose: Rhinorrhea present. No mucosal edema or nasal deformity. No epistaxis. Right sinus exhibits maxillary sinus tenderness. Right sinus exhibits no frontal sinus tenderness. Left sinus exhibits maxillary sinus tenderness. Left sinus exhibits no frontal sinus tenderness.   Mouth/Throat: Uvula is midline and mucous membranes are normal. No trismus in the jaw. Normal dentition. No uvula swelling. Posterior oropharyngeal erythema and cobblestoning present.   Eyes: Conjunctivae and lids are normal. Right eye exhibits no discharge. Left eye exhibits no discharge. No scleral icterus.   Neck: Trachea normal, normal range of motion, full passive range of motion without pain and phonation normal. Neck supple.   Cardiovascular: Normal rate, regular rhythm, normal heart sounds, intact distal pulses and normal pulses.   Pulmonary/Chest: Effort normal and breath sounds normal. No respiratory distress.   Abdominal: Soft. Normal appearance and bowel sounds are normal. She exhibits no distension, no pulsatile midline mass and no mass. There is no tenderness.   Musculoskeletal: Normal range of motion. She exhibits no edema or deformity.   Neurological: She is alert and oriented to person, place, and time. She exhibits normal muscle tone. Coordination normal.   Skin: Skin is warm, dry, intact, not diaphoretic and not pale.   Psychiatric: She has a normal mood and affect. Her speech is normal and behavior is normal. Judgment and thought content normal. Cognition and memory are normal.   Nursing note and vitals reviewed.        Assessment:       1. Viral illness    2. Acute non-recurrent sinusitis, unspecified location    3. Flu-like symptoms        Plan:          Viral illness    Acute non-recurrent sinusitis, unspecified location    Flu-like symptoms    Other orders  -     dexamethasone injection 8 mg  -     dexchlorphen-phenylephrine-DM 1-5-10 mg/5 mL Syrp; Take 5 mLs by mouth every 4 (four) hours as needed.  Dispense: 480 mL; Refill: 0  -     cetirizine (ZYRTEC) 10 MG tablet; Take 1 tablet (10 mg total) by mouth once daily.  Dispense: 30 tablet; Refill: 2  -     fluticasone propionate (FLONASE) 50 mcg/actuation nasal spray; 2 sprays (100 mcg total) by Each Nostril route 2 (two) times daily.  Dispense: 15.8 mL; Refill: 0  -     predniSONE (DELTASONE) 20 MG tablet; Take 1 tablet (20 mg total) by mouth 2 (two) times daily. for 5 days  Dispense: 10 tablet; Refill: 0  -     oseltamivir (TAMIFLU) 75 MG capsule; Take 1 capsule (75 mg total) by mouth 2 (two) times daily. for 5 days  Dispense: 10 capsule; Refill: 0  -     baloxavir marboxiL (XOFLUZA) 20 mg tablet; Take 2 tablets (40 mg total) by mouth once. for 1 dose  Dispense: 2 tablet; Refill: 0

## 2020-02-18 NOTE — PATIENT INSTRUCTIONS
Symptomatic treatment:    Alternate Tylenol and Ibuprofen every 3 hrs for fever, pain and inflammation. Avoid Nsaids if you are pregnant or have advanced kidney disease.     salt water gargles to soothe throat from post nasal drip  Honey/lemon water or warm tea to soothe throat from post nasal drip  Cepachol helps to soothe the discomfort in throat from post nasal drip    Cold-eeze helps to reduce the duration of URI symptoms if taken early  Elderberry to reduce duration of viral URI symptoms    Nasal saline spray reduces inflammation and dryness  Warm face compresses/hot showers as often as you can to open sinuses and allow to drain.   Flonase OTC or Nasacort OTC to help decrease inflammation in nasal turbinates and allow sinuses to drain    Vicks vapor rub at night  Simple foods like chicken noodle soup help provide hydration and nutrition    Delsym helps with coughing at night    Zantac will help if there is reflux from the post nasal drip and helpful to take at night    Zyrtec/Claritin during the day and Benadryl at night may help if allergy component concurrently with URI    Rest as much as you can    Your symptoms will likely last 5-7 days, maybe longer depending on how it affects your body.  You are contagious 5-7, so minimize contact with others to reduce the spread to others and stay home from work or school as we discussed. Dehydration is preventable but is one of the main reasons why you will feel so badly. Drink pedialyte, gatorade or propel. Stay hydrated.  Antibiotics are not needed unless a complication(such as Otitis Media, Bacterial sinus infection or pneumonia) develops. Taking antibiotics for Flu/Cold is not supported by evidence-based medicine and can expose you to unnecessary side effects of the medication, such as anaphylaxis, yeast infection and leads to antibiotic resistance.   If you experience any:  Chest pain, shortness of breath, wheezing or difficulty breathing,  Severe headache, face,  neck or ear pain,  New rash,  Fever over 101.5º F (38.6 C) for more than three days,  Confusion, behavior change or seizure,  Severe weakness or dizziness, please go to the ER immediately for further testing.             POST NASAL DRIP  Postnasal drip is a condition that causes a large amount of mucus to collect in your throat or nose. It may also be called upper airway cough syndrome because the mucus causes repeated coughing. You may have a sore throat, or throat tissues may swell. This may feel like a lump in your throat. You may also feel like you need to clear your throat often.    Manage postnasal drip:  Use a humidifier or vaporizer. Use a cool mist humidifier or a vaporizer to increase air moisture in your home. This may make it easier for you to breathe.  Drink more liquids as directed. Liquids help keep your air passages moist and help you cough up mucus. Ask how much liquid to drink each day and which liquids are best for you.  Sleep on propped up pillows, to keep the mucus from collecting at the back of your throat  Nasal irrigation (available over-the-counter)  Avoid cold air and dry, heated air. Cold or dry air can trigger postnasal drip. Try to stay inside on cold days, or keep your mouth covered. Do not stay long in areas that have dry, heated air.  Do not smoke, and avoid secondhand smoke. Nicotine and other chemicals in cigarettes and cigars can irritate your throat and make coughing worse. Ask your healthcare provider for information if you currently smoke and need help to quit. E-cigarettes or smokeless tobacco still contain nicotine. Talk to your healthcare provider before you use these products.    Medications  Medicines may be given to thin the mucus. You may need to swallow the medicine or use a device to flush your sinuses with liquid squirted into your nose. Nasal sprays may also be needed to keep the tissues in your nose moist. Medicines can also relieve congestion. Allergy medicine may  "help if your symptoms are caused by seasonal allergies, such as hay fever. You may need medicine to help control GERD.  Take your medicine as directed. Contact your healthcare provider if you think your medicine is not helping or if you have side effects. Tell him or her if you are allergic to any medicine. Keep a list of the medicines, vitamins, and herbs you take. Include the amounts, and when and why you take them. Bring the list or the pill bottles to follow-up visits. Carry your medicine list with you in case of an emergency.  A oral decongestant, such as pseudoephedrine (as in Sudafed) or phenylephrine (as in Sudafed PE or Elan-Synephrine)  Guaifenesin (as in Mucinex), a medication that can thin the mucus  An anti-histamine, such as  diphenhydramine, as in Benadryl, chlorpheniramine, as in Chlor-Trimeton, loratadine, as in Claritin or Alavert, fexofenadine (Allegra), cetirizine (Zyrtec), levocetirizine (Xyzal), desloratadine (Clarinex)  A nasal decongestant such as oxymetazoline (contained in Afrin) which constricts blood vessels in the nasal passages; this leads to less secretions. Such medications should only be taken for a day or two; longer-term use can cause more harm than good)  Keep in mind that many of these medications are combined in over-the-counter products. For example, there are several formulations of "Sudafed" containing pseudoephedrine or phenylephrine along with additional drugs including acetaminophen, dextromethorphan, and guaifenesin. While these combinations can be effective, it's important to read the label and avoid taking too much of any active ingredient.  What about prescription treatments?  If these approaches aren't effective, prescription treatments may be the next best steps, including:  A nasal steroid spray (such as beclomethasone/Beconase or triamcinolone/Nasacort)  Ipratropium (Atrovent) nasal spray which inhibits secretions (such as mucus)  Other treatments depend on the " cause of the post-nasal drip. Antibiotics are not usually helpful so they aren't usually prescribed for post-nasal drip (unless the symptoms are due to bacterial infection of the sinuses). For allergies, dusting and vacuuming often, covering your mattresses and pillowcases, and special air filter can help reduce exposure to allergy triggers.    What about chicken soup?  If you've been told that chicken soup helps with post-nasal drip (or other symptoms of a cold or flu), it's true! But it doesn't actually have to be chicken soup - any hot liquid can help thin the mucus and help you maintain hydration.    When should I call a doctor?  In most cases, post-nasal drip is annoying but not dangerous. However, you should contact your doctor if you have:  Unexplained fever  Bloody mucus  Wheezing or shortness of breath  Foul smelling drainage  Persistent symptoms despite treatment  The bad news/good news about post nasal drip  Post-nasal drip is among the most common causes of persistent cough, hoarseness, sore throat and other annoying symptoms. It can be caused by a number of conditions and may linger for weeks or months. That's the bad news. The good news is that most of the causes can be quickly identified and most will improve with treatment.    Acute Sinusitis    Acute sinusitis is irritation and swelling of the sinuses. It is usually caused by a viral infection after a common cold. Your doctor can help you find relief.  What is acute sinusitis?  Sinuses are air-filled spaces in the skull behind the face. They are kept moist and clean by a lining of mucosa. Things such as pollen, smoke, and chemical fumes can irritate the mucosa. It can then swell up. As a response to irritation, the mucosa makes more mucus and other fluids. Tiny hairlike cilia cover the mucosa. Cilia help carry mucus toward the opening of the sinus. Too much mucus may cause the cilia to stop working. This blocks the sinus opening. A buildup of fluid  in the sinuses then causes pain and pressure. It can also encourage bacteria to grow in the sinuses.  Common symptoms of acute sinusitis  You may have:  · Facial soreness pain  · Headache  · Fever  · Fluid draining in the back of the throat (postnasal drip)  · Congestion  · Drainage that is thick and colored, instead of clear  · Cough  Diagnosing acute sinusitis  Your doctor will ask about your symptoms and health history. He or she will look at your ear, nose, and throat. You usually won't need to have X-rays taken.    The doctor may take a sample of mucus to check for bacteria. If you have sinusitis that keeps coming back, you may need imaging tests such as X-rays or CAT scans. This will help your doctor check for a structural problem that may be causing the infection.  Treating acute sinusitis  Treatment is aimed at unblocking the sinus opening and helping the cilia work again. You may need to take antihistamine and decongestant medicine. These can reduce inflammation and decrease the amount of fluid your sinuses make. If you have a bacterial infection, you will need to take antibiotic medicine for 10 to 14 days. Take this medicine until it is gone, even if you feel better.  Date Last Reviewed: 10/1/2016  © 9257-6683 The "[employer]. [address] [place] [address], [place] [address]. All rights reserved. This information is not intended as a substitute for professional medical care. Always follow your healthcare professional's instructions.

## 2020-05-04 ENCOUNTER — HOSPITAL ENCOUNTER (OUTPATIENT)
Dept: RADIOLOGY | Facility: HOSPITAL | Age: 65
Discharge: HOME | End: 2020-05-04
Attending: SPECIALIST
Payer: MEDICAID

## 2020-05-04 DIAGNOSIS — M79.672 LEFT FOOT PAIN: Primary | ICD-10-CM

## 2020-05-04 DIAGNOSIS — M79.672 LEFT FOOT PAIN: ICD-10-CM

## 2020-05-04 PROCEDURE — 73630 X-RAY EXAM OF FOOT: CPT | Mod: TC,PO,LT

## 2020-05-11 ENCOUNTER — OFFICE VISIT (OUTPATIENT)
Dept: PODIATRY | Facility: CLINIC | Age: 65
End: 2020-05-11
Payer: MEDICAID

## 2020-05-11 VITALS
SYSTOLIC BLOOD PRESSURE: 119 MMHG | WEIGHT: 164 LBS | HEIGHT: 63 IN | TEMPERATURE: 97 F | BODY MASS INDEX: 29.06 KG/M2 | DIASTOLIC BLOOD PRESSURE: 73 MMHG | HEART RATE: 52 BPM

## 2020-05-11 DIAGNOSIS — M84.375A STRESS FRACTURE OF METATARSAL BONE OF LEFT FOOT, INITIAL ENCOUNTER: Primary | ICD-10-CM

## 2020-05-11 DIAGNOSIS — M79.672 LEFT FOOT PAIN: ICD-10-CM

## 2020-05-11 PROCEDURE — 99203 OFFICE O/P NEW LOW 30 MIN: CPT | Mod: S$PBB,,, | Performed by: PODIATRIST

## 2020-05-11 PROCEDURE — 99213 OFFICE O/P EST LOW 20 MIN: CPT | Performed by: PODIATRIST

## 2020-05-11 PROCEDURE — 99203 PR OFFICE/OUTPT VISIT, NEW, LEVL III, 30-44 MIN: ICD-10-PCS | Mod: S$PBB,,, | Performed by: PODIATRIST

## 2020-05-11 RX ORDER — ASPIRIN 325 MG
325 TABLET, DELAYED RELEASE (ENTERIC COATED) ORAL
COMMUNITY

## 2020-05-11 RX ORDER — MECLIZINE HCL 12.5 MG 12.5 MG/1
TABLET ORAL
COMMUNITY

## 2020-05-11 RX ORDER — VALACYCLOVIR HYDROCHLORIDE 1 G/1
TABLET, FILM COATED ORAL
COMMUNITY

## 2020-05-11 RX ORDER — FERROUS SULFATE 325(65) MG
325 TABLET, DELAYED RELEASE (ENTERIC COATED) ORAL
COMMUNITY

## 2020-05-11 RX ORDER — MULTIVITAMIN
1 TABLET ORAL
COMMUNITY

## 2020-05-11 RX ORDER — PREDNISONE 20 MG/1
TABLET ORAL
COMMUNITY

## 2020-05-11 RX ORDER — DOXYCYCLINE 100 MG/1
CAPSULE ORAL
COMMUNITY

## 2020-05-11 RX ORDER — ALENDRONATE SODIUM 70 MG/1
TABLET ORAL
COMMUNITY

## 2020-05-11 RX ORDER — AZITHROMYCIN 250 MG/1
TABLET, FILM COATED ORAL
COMMUNITY

## 2020-05-11 RX ORDER — CLARITHROMYCIN 500 MG/1
TABLET, FILM COATED ORAL
COMMUNITY

## 2020-05-11 RX ORDER — ANASTROZOLE 1 MG/1
TABLET ORAL
COMMUNITY

## 2020-05-11 RX ORDER — HYDROCODONE BITARTRATE AND ACETAMINOPHEN 5; 325 MG/1; MG/1
1 TABLET ORAL EVERY 6 HOURS PRN
Qty: 20 TABLET | Refills: 0 | Status: SHIPPED | OUTPATIENT
Start: 2020-05-11

## 2020-05-11 RX ORDER — ISOSORBIDE MONONITRATE 30 MG/1
TABLET, EXTENDED RELEASE ORAL
COMMUNITY

## 2020-05-11 RX ORDER — METOPROLOL SUCCINATE 25 MG/1
TABLET, EXTENDED RELEASE ORAL
COMMUNITY
Start: 2019-03-14

## 2020-05-11 RX ORDER — THIAMINE HCL 250 MG
250 TABLET ORAL
COMMUNITY

## 2020-05-11 RX ORDER — CLINDAMYCIN HYDROCHLORIDE 300 MG/1
CAPSULE ORAL
COMMUNITY

## 2020-05-11 RX ORDER — HYDROCODONE BITARTRATE AND ACETAMINOPHEN 10; 325 MG/1; MG/1
TABLET ORAL
COMMUNITY
End: 2020-05-11

## 2020-05-11 RX ORDER — FLUTICASONE PROPIONATE 50 MCG
SPRAY, SUSPENSION (ML) NASAL
COMMUNITY
Start: 2020-02-18

## 2020-05-11 RX ORDER — POTASSIUM CHLORIDE 750 MG/1
CAPSULE, EXTENDED RELEASE ORAL
COMMUNITY

## 2020-05-11 RX ORDER — FLUOCINONIDE 0.5 MG/G
CREAM TOPICAL
COMMUNITY

## 2020-05-11 RX ORDER — ENOXAPARIN SODIUM 100 MG/ML
INJECTION SUBCUTANEOUS
COMMUNITY

## 2020-05-11 RX ORDER — TRAMADOL HYDROCHLORIDE 50 MG/1
TABLET ORAL
COMMUNITY

## 2020-05-11 RX ORDER — ACETAMINOPHEN 500 MG
1 TABLET ORAL
COMMUNITY

## 2020-05-11 RX ORDER — FLUTICASONE FUROATE AND VILANTEROL 100; 25 UG/1; UG/1
POWDER RESPIRATORY (INHALATION)
COMMUNITY

## 2020-05-11 RX ORDER — CALCIUM CARBONATE 600 MG
600 TABLET ORAL
COMMUNITY

## 2020-05-11 RX ORDER — NITROGLYCERIN 0.4 MG/1
0.4 TABLET SUBLINGUAL
COMMUNITY

## 2020-05-11 RX ORDER — GABAPENTIN 100 MG/1
CAPSULE ORAL
COMMUNITY

## 2020-05-11 RX ORDER — ALBUTEROL SULFATE 90 UG/1
AEROSOL, METERED RESPIRATORY (INHALATION)
COMMUNITY
Start: 2018-11-26

## 2020-05-11 RX ORDER — BUDESONIDE AND FORMOTEROL FUMARATE DIHYDRATE 160; 4.5 UG/1; UG/1
AEROSOL RESPIRATORY (INHALATION)
COMMUNITY
End: 2021-02-12

## 2020-05-11 RX ORDER — PROTEIN SUPPLEMENT
POWDER (GRAM) ORAL
COMMUNITY

## 2020-05-11 RX ORDER — METFORMIN HYDROCHLORIDE 500 MG/1
TABLET ORAL
COMMUNITY

## 2020-05-11 RX ORDER — BISACODYL 5 MG
5 TABLET, DELAYED RELEASE (ENTERIC COATED) ORAL
COMMUNITY

## 2020-05-11 RX ORDER — LISINOPRIL 20 MG/1
TABLET ORAL
COMMUNITY

## 2020-05-11 RX ORDER — FUROSEMIDE 20 MG/1
TABLET ORAL
COMMUNITY

## 2020-05-11 RX ORDER — SYRING-NEEDL,DISP,INSUL,0.3 ML 29 G X1/2"
SYRINGE, EMPTY DISPOSABLE MISCELLANEOUS
COMMUNITY

## 2020-05-11 RX ORDER — AMOXICILLIN AND CLAVULANATE POTASSIUM 875; 125 MG/1; MG/1
TABLET, FILM COATED ORAL
COMMUNITY

## 2020-05-11 RX ORDER — METOPROLOL SUCCINATE 50 MG/1
TABLET, EXTENDED RELEASE ORAL
COMMUNITY

## 2020-05-11 RX ORDER — WARFARIN SODIUM 5 MG/1
TABLET ORAL
COMMUNITY

## 2020-05-11 RX ORDER — ONDANSETRON 4 MG/1
TABLET, ORALLY DISINTEGRATING ORAL
COMMUNITY

## 2020-05-11 RX ORDER — ALLOPURINOL 100 MG/1
TABLET ORAL
COMMUNITY

## 2020-05-11 RX ORDER — AZELASTINE 1 MG/ML
SPRAY, METERED NASAL
COMMUNITY

## 2020-05-11 RX ORDER — LISINOPRIL AND HYDROCHLOROTHIAZIDE 12.5; 2 MG/1; MG/1
TABLET ORAL
COMMUNITY

## 2020-05-11 RX ORDER — ERGOCALCIFEROL 1.25 MG/1
CAPSULE ORAL
COMMUNITY

## 2020-05-11 RX ORDER — CETIRIZINE HYDROCHLORIDE 10 MG/1
TABLET ORAL
COMMUNITY
Start: 2020-02-18

## 2020-05-11 RX ORDER — ATORVASTATIN CALCIUM 20 MG/1
TABLET, FILM COATED ORAL
COMMUNITY

## 2020-05-11 NOTE — PROGRESS NOTES
"  [address] [facility] [address]  [place] [address]  Phone: [phone]   [phone]    Patient's PCP:Quinn Vivas MD  Referring Provider: No ref. provider found    Subjective:      Chief Complaint:: No chief complaint on file.    HPI  Stephani Willoughby is a 64 y.o. female who presents with a complaint of  Left foot pain top and arch lasting for two weeks. Onset of the symptoms was "I woke up and had the pain."  Current symptoms include pain. "Even my toes are swollen.Constant pain."  Aggravating factors are walking ,standing and sitting. Symptoms have gotten worse.Treatment to date have included epson salt soaks, oil and pain medication, ice.Saw Dr. Montemayor on 5-4-20 sent me for an ultrasound, xrays and labs. Patients rates pain 6/10 at rest but walking it goes up on pain scale. "Pain sometimes that goes up my leg to my groin."        Vitals:    05/11/20 0906   BP: 119/73   Pulse: (!) 52   Temp: 96.5 °F (35.8 °C)     Shoe Size: 8-8.5    Past Surgical History:   Procedure Laterality Date    AORTIC VALVE REPLACEMENT      BREAST LUMPECTOMY Left 2014    BREAST SURGERY      CARDIAC SURGERY      CHOLECYSTECTOMY      CORONARY ARTERY BYPASS GRAFT      x1 vessel    EYE SURGERY      HYSTERECTOMY      KNEE CARTILAGE SURGERY       Past Medical History:   Diagnosis Date    Anemia     CHF (congestive heart failure)     Coarctation of aorta     Coronary artery disease     Diastolic heart failure     DM (diabetes mellitus)     GERD (gastroesophageal reflux disease)     Hyperlipidemia     ARNAUD (obstructive sleep apnea)     Pickwickian syndrome     Pneumonia      Family History   Problem Relation Age of Onset    Heart disease Mother     Alzheimer's disease Mother     Heart disease Father     Diabetes Father     Kidney disease Father     Breast cancer Paternal Aunt         Social History:   Marital Status:   Alcohol History:  reports that she does not drink alcohol.  Tobacco History: "  reports that she quit smoking about 14 years ago. Her smoking use included cigarettes. She started smoking about 34 years ago. She has a 60.00 pack-year smoking history. She has never used smokeless tobacco.  Drug History:  reports that she does not use drugs.    Review of patient's allergies indicates:   Allergen Reactions    Iodinated contrast media Itching and Other (See Comments)     Throat itching     Vancomycin analogues Itching    Sulfa (sulfonamide antibiotics) Itching    Codeine Nausea Only    Avelox [moxifloxacin] Palpitations    Pcn [penicillins] Other (See Comments)     Bumps in mouth after taking for a while.        Current Outpatient Medications   Medication Sig Dispense Refill    albuterol (PROVENTIL/VENTOLIN HFA) 90 mcg/actuation inhaler albuterol sulfate HFA 90 mcg/actuation aerosol inhaler      alendronate (FOSAMAX) 70 MG tablet alendronate 70 mg tablet      anastrozole (ARIMIDEX) 1 mg Tab anastrozole 1 mg tablet      aspirin (ECOTRIN) 325 MG EC tablet Take 325 mg by mouth.      atorvastatin (LIPITOR) 20 MG tablet atorvastatin 20 mg tablet      azelastine (ASTELIN) 137 mcg (0.1 %) nasal spray azelastine 137 mcg (0.1 %) nasal spray aerosol      bisacodyL (DULCOLAX) 5 mg EC tablet Take 5 mg by mouth.      blood sugar diagnostic Strp OneTouch Ultra Blue Test Strip      calcium carbonate (OS-SYBIL) 600 mg calcium (1,500 mg) Tab Take 600 mg by mouth.      cetirizine (ZYRTEC) 10 MG tablet cetirizine 10 mg tablet      ergocalciferol (ERGOCALCIFEROL) 50,000 unit Cap Vitamin D2 1,250 mcg (50,000 unit) capsule      fluticasone propionate (FLONASE) 50 mcg/actuation nasal spray fluticasone propionate 50 mcg/actuation nasal spray,suspension      lisinopriL (PRINIVIL,ZESTRIL) 20 MG tablet lisinopril 20 mg tablet      multivitamin (THERAGRAN) per tablet Take 1 tablet by mouth.      nitroGLYCERIN (NITROSTAT) 0.4 MG SL tablet Place 0.4 mg under the tongue.      omeprazole (PRILOSEC) 20 MG  capsule Take 20 mg by mouth once daily.      ondansetron (ZOFRAN-ODT) 4 MG TbDL ondansetron 4 mg disintegrating tablet      protein Powd Take by mouth.      warfarin (JANTOVEN) 5 MG tablet Jantoven 5 mg tablet      allopurinoL (ZYLOPRIM) 100 MG tablet allopurinol 100 mg tablet      amoxicillin-clavulanate 875-125mg (AUGMENTIN) 875-125 mg per tablet amoxicillin 875 mg-potassium clavulanate 125 mg tablet      ascorbic acid, vitamin C, (VITAMIN C) 100 MG tablet Take 100 mg by mouth.      azithromycin (Z-CHANA) 250 MG tablet azithromycin 250 mg tablet      budesonide-formoterol 160-4.5 mcg (SYMBICORT) 160-4.5 mcg/actuation HFAA Symbicort 160 mcg-4.5 mcg/actuation HFA aerosol inhaler      calcium carbonate-vitamin D3 600 mg(1,500mg) -800 unit Tab Take 1 tablet by mouth.      clarithromycin (BIAXIN) 500 MG tablet clarithromycin 500 mg tablet      clindamycin (CLEOCIN) 300 MG capsule clindamycin HCl 300 mg capsule      cyanocobalamin, vitamin B-12, 50 mcg tablet Take 50 mcg by mouth.      dexchlorphen-phenylephrine-DM 1-5-10 mg/5 mL Syrp Take 5 mLs by mouth every 4 (four) hours as needed. (Patient not taking: Reported on 5/11/2020) 480 mL 0    doxycycline (MONODOX) 100 MG capsule doxycycline monohydrate 100 mg capsule      enoxaparin (LOVENOX) 100 mg/mL Syrg enoxaparin 100 mg/mL subcutaneous syringe      ferrous sulfate 325 (65 FE) MG EC tablet Take 325 mg by mouth.      fluocinonide 0.05% (LIDEX) 0.05 % cream fluocinonide 0.05 % topical cream      fluticasone furoate (ARNUITY ELLIPTA) 100 mcg/actuation DsDv Arnuity Ellipta 100 mcg/actuation powder for inhalation      fluticasone furoate-vilanteroL (BREO) 100-25 mcg/dose diskus inhaler Breo Ellipta 100 mcg-25 mcg/dose powder for inhalation      furosemide (LASIX) 20 MG tablet furosemide 20 mg tablet      gabapentin (NEURONTIN) 100 MG capsule gabapentin 100 mg capsule      HYDROcodone-acetaminophen (NORCO) 5-325 mg per tablet Take 1 tablet by mouth every  6 (six) hours as needed for Pain. 20 tablet 0    isosorbide mononitrate (IMDUR) 30 MG 24 hr tablet isosorbide mononitrate ER 30 mg tablet,extended release 24 hr      lisinopriL-hydrochlorothiazide (PRINZIDE,ZESTORETIC) 20-12.5 mg per tablet lisinopril 20 mg-hydrochlorothiazide 12.5 mg tablet      magnesium citrate solution magnesium citrate oral solution      meclizine (ANTIVERT) 12.5 mg tablet meclizine 12.5 mg tablet      metFORMIN (GLUCOPHAGE) 500 MG tablet metformin 500 mg tablet      metoprolol succinate (TOPROL-XL) 25 MG 24 hr tablet metoprolol succinate ER 25 mg tablet,extended release 24 hr      metoprolol succinate (TOPROL-XL) 50 MG 24 hr tablet metoprolol succinate ER 50 mg tablet,extended release 24 hr      multivit-min/ferrous fumarate (MULTI VITAMIN ORAL) Take by mouth.      pneumoc 13-phan conj-dip cr,PF, (PREVNAR 13, PF,) 0.5 mL Syrg Prevnar 13 (PF) 0.5 mL intramuscular syringe      potassium chloride (MICRO-K) 10 MEQ CpSR potassium chloride ER 10 mEq capsule,extended release      predniSONE (DELTASONE) 20 MG tablet prednisone 20 mg tablet      thiamine 250 MG tablet Take 250 mg by mouth.      traMADoL (ULTRAM) 50 mg tablet tramadol 50 mg tablet      triamcinolone acetonide (NASACORT NASL) by Nasal route.      valACYclovir (VALTREX) 1000 MG tablet valacyclovir 1 gram tablet       No current facility-administered medications for this visit.        Review of Systems      Objective:        Physical Exam:   Foot Exam    General  Orientation: alert and oriented to person, place, and time   Affect: appropriate   Gait: antalgic       Left Foot/Ankle      Inspection and Palpation  Ecchymosis: none  Tenderness: (Generalized tenderness across the midfoot)  Swelling: (Mild edema of the foot and ankle )  Arch: normal  Skin Exam: skin intact;     Neurovascular  Dorsalis pedis: 2+  Posterior tibial: 2+  Saphenous nerve sensation: normal  Tibial nerve sensation: normal  Superficial peroneal nerve  sensation: normal  Deep peroneal nerve sensation: normal  Sural nerve sensation: normal    Muscle Strength  Ankle dorsiflexion: 5  Ankle plantar flexion: 5  Ankle inversion: 5  Ankle eversion: 5  Great toe extension: 5  Great toe flexion: 5    Range of Motion    Passive  Ankle dorsiflexion: pain  Plantar flexion: pain  Ankle eversion: pain  Ankle inversion: pain      Tests  Anterior drawer: negative   Talar tilt: negative   PT Tinel's sign: negative  Too many toes: negative           Physical Exam   Cardiovascular:   Pulses:       Dorsalis pedis pulses are 2+ on the left side.        Posterior tibial pulses are 2+ on the left side.       Imaging: X-Ray Foot Complete 3 view Left  Narrative: EXAMINATION:  XR FOOT COMPLETE 3 VIEW LEFT    CLINICAL HISTORY:  Pain in left foot    COMPARISON:  None.    FINDINGS:  The bones are demineralized.  No acute fracture, dislocation or osseous destruction is observed. The joint are spaces are well maintained.  There is small spur formation along the calcaneal tuberosity.  The soft tissues appear unremarkable.  Impression: No acute osseous abnormality.    Electronically signed by: Janice Aquino IV., MD  Date:    05/04/2020  Time:    12:16               Assessment:       1. Stress fracture of metatarsal bone of left foot, initial encounter    2. Left foot pain      Plan:   Stress fracture of metatarsal bone of left foot, initial encounter  -     MRI Foot (Midfoot) Left Without Contrast; Future; Expected date: 05/11/2020  -     AIR CAST WALKER BOOT FOR HOME USE  -     HYDROcodone-acetaminophen (NORCO) 5-325 mg per tablet; Take 1 tablet by mouth every 6 (six) hours as needed for Pain.  Dispense: 20 tablet; Refill: 0    Left foot pain  -     AIR CAST WALKER BOOT FOR HOME USE  -     HYDROcodone-acetaminophen (NORCO) 5-325 mg per tablet; Take 1 tablet by mouth every 6 (six) hours as needed for Pain.  Dispense: 20 tablet; Refill: 0      Follow up pending MRI results .    Procedures -  None    CAM walker boot with weight bearing  Ice to painful area, 15 minutes at a time  Ace-wrap to help with swelling  No barefoot   Keep affected extremity elevated while seated      Counseling:     I provided patient education verbally regarding:   Patient diagnosis, treatment options, as well as alternatives, risks, and benefits.     I discussed stress fracture of bone, mechanism, delay in radiological findings, possible MRI, use of removable cast for 6 to 8 weeks and rarely surgery.      This note was created using Dragon voice recognition software that occasionally misinterpreted phrases or words.

## 2020-05-15 ENCOUNTER — HOSPITAL ENCOUNTER (OUTPATIENT)
Dept: RADIOLOGY | Facility: HOSPITAL | Age: 65
Discharge: HOME | End: 2020-05-15
Attending: PODIATRIST
Payer: MEDICAID

## 2020-05-15 DIAGNOSIS — M84.375A STRESS FRACTURE OF METATARSAL BONE OF LEFT FOOT, INITIAL ENCOUNTER: ICD-10-CM

## 2020-05-15 PROCEDURE — 73718 MRI LOWER EXTREMITY W/O DYE: CPT | Mod: TC,PO,LT

## 2020-05-19 ENCOUNTER — TELEPHONE (OUTPATIENT)
Dept: PODIATRY | Facility: CLINIC | Age: 65
End: 2020-05-19

## 2020-05-19 NOTE — TELEPHONE ENCOUNTER
----- Message from Navid Berrios DPM sent at 5/19/2020  2:40 PM CDT -----  Let pt know there is no fracture. She should stay in the CAM boot as we discussed at her visit.

## 2020-05-20 NOTE — TELEPHONE ENCOUNTER
Let pt know it is a sign of inflammation, just like the swelling they see in the two bones. This is usually from some sort of injury. Treatment for it is rest/immobilization in the boot like she is doing now.

## 2021-02-11 ENCOUNTER — TELEPHONE (OUTPATIENT)
Dept: PULMONOLOGY | Facility: CLINIC | Age: 66
End: 2021-02-11

## 2021-02-11 DIAGNOSIS — U07.1 COVID-19: Primary | ICD-10-CM

## 2021-02-12 ENCOUNTER — INFUSION (OUTPATIENT)
Dept: INFECTIOUS DISEASES | Facility: HOSPITAL | Age: 66
End: 2021-02-12
Attending: NURSE PRACTITIONER
Payer: MEDICAID

## 2021-02-12 VITALS
DIASTOLIC BLOOD PRESSURE: 70 MMHG | TEMPERATURE: 97 F | SYSTOLIC BLOOD PRESSURE: 142 MMHG | WEIGHT: 164 LBS | RESPIRATION RATE: 20 BRPM | BODY MASS INDEX: 29.06 KG/M2 | HEART RATE: 58 BPM | HEIGHT: 63 IN | OXYGEN SATURATION: 97 %

## 2021-02-12 DIAGNOSIS — U07.1 COVID-19: ICD-10-CM

## 2021-02-12 PROCEDURE — 25000003 PHARM REV CODE 250: Performed by: NURSE PRACTITIONER

## 2021-02-12 PROCEDURE — M0243 CASIRIVI AND IMDEVI INFUSION: HCPCS | Performed by: NURSE PRACTITIONER

## 2021-02-12 PROCEDURE — 63600175 PHARM REV CODE 636 W HCPCS: Performed by: NURSE PRACTITIONER

## 2021-02-12 RX ORDER — DIPHENHYDRAMINE HYDROCHLORIDE 50 MG/ML
25 INJECTION INTRAMUSCULAR; INTRAVENOUS ONCE AS NEEDED
Status: DISPENSED | OUTPATIENT
Start: 2021-02-12 | End: 2032-07-11

## 2021-02-12 RX ORDER — ALBUTEROL SULFATE 90 UG/1
2 AEROSOL, METERED RESPIRATORY (INHALATION)
Status: DISPENSED | OUTPATIENT
Start: 2021-02-12

## 2021-02-12 RX ORDER — EPINEPHRINE 0.1 MG/ML
0.3 INJECTION INTRAVENOUS
Status: DISPENSED | OUTPATIENT
Start: 2021-02-12

## 2021-02-12 RX ORDER — ACETAMINOPHEN 325 MG/1
650 TABLET ORAL ONCE AS NEEDED
Status: DISPENSED | OUTPATIENT
Start: 2021-02-12 | End: 2032-07-11

## 2021-02-12 RX ORDER — SODIUM CHLORIDE 0.9 % (FLUSH) 0.9 %
10 SYRINGE (ML) INJECTION
Status: ACTIVE | OUTPATIENT
Start: 2021-02-12

## 2021-02-12 RX ORDER — ONDANSETRON 4 MG/1
4 TABLET, ORALLY DISINTEGRATING ORAL ONCE AS NEEDED
Status: DISPENSED | OUTPATIENT
Start: 2021-02-12 | End: 2032-07-11

## 2021-02-12 RX ADMIN — CASIRIVIMAB: 1332 INJECTION, SOLUTION, CONCENTRATE INTRAVENOUS at 12:02

## 2021-02-12 RX ADMIN — SODIUM CHLORIDE: 0.9 INJECTION, SOLUTION INTRAVENOUS at 12:02

## 2021-04-29 ENCOUNTER — PATIENT MESSAGE (OUTPATIENT)
Dept: RESEARCH | Facility: HOSPITAL | Age: 66
End: 2021-04-29

## 2022-01-10 ENCOUNTER — LAB VISIT (OUTPATIENT)
Dept: PRIMARY CARE CLINIC | Facility: OTHER | Age: 67
End: 2022-01-10
Attending: INTERNAL MEDICINE
Payer: MEDICAID

## 2022-01-10 ENCOUNTER — PATIENT MESSAGE (OUTPATIENT)
Dept: ADMINISTRATIVE | Facility: OTHER | Age: 67
End: 2022-01-10
Payer: MEDICAID

## 2022-01-10 DIAGNOSIS — Z20.822 ENCOUNTER FOR LABORATORY TESTING FOR COVID-19 VIRUS: ICD-10-CM

## 2022-01-10 PROCEDURE — U0003 INFECTIOUS AGENT DETECTION BY NUCLEIC ACID (DNA OR RNA); SEVERE ACUTE RESPIRATORY SYNDROME CORONAVIRUS 2 (SARS-COV-2) (CORONAVIRUS DISEASE [COVID-19]), AMPLIFIED PROBE TECHNIQUE, MAKING USE OF HIGH THROUGHPUT TECHNOLOGIES AS DESCRIBED BY CMS-2020-01-R: HCPCS | Performed by: INTERNAL MEDICINE

## 2022-01-12 ENCOUNTER — TELEPHONE (OUTPATIENT)
Dept: PULMONOLOGY | Facility: CLINIC | Age: 67
End: 2022-01-12
Payer: MEDICAID

## 2022-01-12 DIAGNOSIS — U07.1 COVID-19: Primary | ICD-10-CM

## 2022-01-12 DIAGNOSIS — U07.1 COVID-19 VIRUS DETECTED: ICD-10-CM

## 2022-01-12 LAB
SARS-COV-2 RNA RESP QL NAA+PROBE: DETECTED
SARS-COV-2- CYCLE NUMBER: 21

## 2022-01-13 ENCOUNTER — INFUSION (OUTPATIENT)
Dept: INFECTIOUS DISEASES | Facility: HOSPITAL | Age: 67
End: 2022-01-13
Attending: EMERGENCY MEDICINE
Payer: MEDICAID

## 2022-01-13 VITALS
SYSTOLIC BLOOD PRESSURE: 157 MMHG | TEMPERATURE: 98 F | HEART RATE: 57 BPM | OXYGEN SATURATION: 96 % | DIASTOLIC BLOOD PRESSURE: 67 MMHG | RESPIRATION RATE: 16 BRPM

## 2022-01-13 DIAGNOSIS — U07.1 COVID-19: Primary | ICD-10-CM

## 2022-01-13 PROCEDURE — 63600175 PHARM REV CODE 636 W HCPCS: Performed by: EMERGENCY MEDICINE

## 2022-01-13 PROCEDURE — M0243 CASIRIVI AND IMDEVI INFUSION: HCPCS | Performed by: EMERGENCY MEDICINE

## 2022-01-13 PROCEDURE — 25000003 PHARM REV CODE 250: Performed by: EMERGENCY MEDICINE

## 2022-01-13 RX ORDER — SODIUM CHLORIDE 0.9 % (FLUSH) 0.9 %
10 SYRINGE (ML) INJECTION
Status: ACTIVE | OUTPATIENT
Start: 2022-01-13

## 2022-01-13 RX ORDER — ACETAMINOPHEN 325 MG/1
650 TABLET ORAL ONCE AS NEEDED
Status: ACTIVE | OUTPATIENT
Start: 2022-01-13 | End: 2033-06-11

## 2022-01-13 RX ORDER — DIPHENHYDRAMINE HYDROCHLORIDE 50 MG/ML
25 INJECTION INTRAMUSCULAR; INTRAVENOUS ONCE AS NEEDED
Status: ACTIVE | OUTPATIENT
Start: 2022-01-13 | End: 2033-06-11

## 2022-01-13 RX ORDER — ALBUTEROL SULFATE 90 UG/1
2 AEROSOL, METERED RESPIRATORY (INHALATION)
Status: ACTIVE | OUTPATIENT
Start: 2022-01-13

## 2022-01-13 RX ORDER — ONDANSETRON 2 MG/ML
4 INJECTION INTRAMUSCULAR; INTRAVENOUS ONCE AS NEEDED
Status: ACTIVE | OUTPATIENT
Start: 2022-01-13 | End: 2033-06-11

## 2022-01-13 RX ORDER — EPINEPHRINE 0.3 MG/.3ML
0.3 INJECTION SUBCUTANEOUS
Status: ACTIVE | OUTPATIENT
Start: 2022-01-13

## 2022-01-13 RX ADMIN — CASIRIVIMAB AND IMDEVIMAB 600 MG: 600; 600 INJECTION, SOLUTION, CONCENTRATE INTRAVENOUS at 02:01

## 2023-03-07 ENCOUNTER — OFFICE VISIT (OUTPATIENT)
Dept: ORTHOPEDICS | Facility: CLINIC | Age: 68
End: 2023-03-07
Payer: MEDICARE

## 2023-03-07 VITALS — BODY MASS INDEX: 29.06 KG/M2 | HEIGHT: 63 IN | WEIGHT: 164 LBS

## 2023-03-07 DIAGNOSIS — M75.41 ROTATOR CUFF IMPINGEMENT SYNDROME, RIGHT: ICD-10-CM
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
## 2023-03-07 DIAGNOSIS — M70.62 GREATER TROCHANTERIC BURSITIS, LEFT: ICD-10-CM

## 2023-03-07 DIAGNOSIS — M51.36 DISC DEGENERATION, LUMBAR: Primary | ICD-10-CM

## 2023-03-07 DIAGNOSIS — M47.816 LUMBAR FACET ARTHROPATHY: ICD-10-CM

## 2023-03-07 PROCEDURE — 1159F MED LIST DOCD IN RCRD: CPT | Mod: CPTII,S$GLB,, | Performed by: ORTHOPAEDIC SURGERY

## 2023-03-07 PROCEDURE — 1125F AMNT PAIN NOTED PAIN PRSNT: CPT | Mod: CPTII,S$GLB,, | Performed by: ORTHOPAEDIC SURGERY

## 2023-03-07 PROCEDURE — 20610 DRAIN/INJ JOINT/BURSA W/O US: CPT | Mod: 50,S$GLB,, | Performed by: ORTHOPAEDIC SURGERY

## 2023-03-07 PROCEDURE — 99203 PR OFFICE/OUTPT VISIT, NEW, LEVL III, 30-44 MIN: ICD-10-PCS | Mod: 25,S$GLB,, | Performed by: ORTHOPAEDIC SURGERY

## 2023-03-07 PROCEDURE — 20610 LARGE JOINT ASPIRATION/INJECTION: L GREATER TROCHANTERIC BURSA: ICD-10-PCS | Mod: 50,S$GLB,, | Performed by: ORTHOPAEDIC SURGERY

## 2023-03-07 PROCEDURE — 3288F FALL RISK ASSESSMENT DOCD: CPT | Mod: CPTII,S$GLB,, | Performed by: ORTHOPAEDIC SURGERY

## 2023-03-07 PROCEDURE — 1101F PT FALLS ASSESS-DOCD LE1/YR: CPT | Mod: CPTII,S$GLB,, | Performed by: ORTHOPAEDIC SURGERY

## 2023-03-07 PROCEDURE — 1101F PR PT FALLS ASSESS DOC 0-1 FALLS W/OUT INJ PAST YR: ICD-10-PCS | Mod: CPTII,S$GLB,, | Performed by: ORTHOPAEDIC SURGERY

## 2023-03-07 PROCEDURE — 3008F BODY MASS INDEX DOCD: CPT | Mod: CPTII,S$GLB,, | Performed by: ORTHOPAEDIC SURGERY

## 2023-03-07 PROCEDURE — 1160F RVW MEDS BY RX/DR IN RCRD: CPT | Mod: CPTII,S$GLB,, | Performed by: ORTHOPAEDIC SURGERY

## 2023-03-07 PROCEDURE — 99203 OFFICE O/P NEW LOW 30 MIN: CPT | Mod: 25,S$GLB,, | Performed by: ORTHOPAEDIC SURGERY

## 2023-03-07 PROCEDURE — 3008F PR BODY MASS INDEX (BMI) DOCUMENTED: ICD-10-PCS | Mod: CPTII,S$GLB,, | Performed by: ORTHOPAEDIC SURGERY

## 2023-03-07 PROCEDURE — 3288F PR FALLS RISK ASSESSMENT DOCUMENTED: ICD-10-PCS | Mod: CPTII,S$GLB,, | Performed by: ORTHOPAEDIC SURGERY

## 2023-03-07 PROCEDURE — 1159F PR MEDICATION LIST DOCUMENTED IN MEDICAL RECORD: ICD-10-PCS | Mod: CPTII,S$GLB,, | Performed by: ORTHOPAEDIC SURGERY

## 2023-03-07 PROCEDURE — 1160F PR REVIEW ALL MEDS BY PRESCRIBER/CLIN PHARMACIST DOCUMENTED: ICD-10-PCS | Mod: CPTII,S$GLB,, | Performed by: ORTHOPAEDIC SURGERY

## 2023-03-07 PROCEDURE — 1125F PR PAIN SEVERITY QUANTIFIED, PAIN PRESENT: ICD-10-PCS | Mod: CPTII,S$GLB,, | Performed by: ORTHOPAEDIC SURGERY

## 2023-03-07 RX ORDER — TRIAMCINOLONE ACETONIDE 40 MG/ML
40 INJECTION, SUSPENSION INTRA-ARTICULAR; INTRAMUSCULAR
Status: DISCONTINUED | OUTPATIENT
Start: 2023-03-07 | End: 2023-03-07 | Stop reason: HOSPADM

## 2023-03-07 RX ADMIN — TRIAMCINOLONE ACETONIDE 40 MG: 40 INJECTION, SUSPENSION INTRA-ARTICULAR; INTRAMUSCULAR at 10:03

## 2023-03-07 NOTE — PROCEDURES
Large Joint Aspiration/Injection: L greater trochanteric bursa    Date/Time: 3/7/2023 10:30 AM  Performed by: Yony Huerta MD  Authorized by: Yony Huerta MD     Consent Done?:  Yes (Verbal)  Indications:  Pain  Site marked: the procedure site was marked    Timeout: prior to procedure the correct patient, procedure, and site was verified    Prep: patient was prepped and draped in usual sterile fashion      Local anesthesia used?: Yes    Local anesthetic:  Lidocaine 1% without epinephrine  Ultrasonic Guidance for needle placement?: No    Location:  Hip  Site:  L greater trochanteric bursa  Medications:  40 mg triamcinolone acetonide 40 mg/mL  Patient tolerance:  Patient tolerated the procedure well with no immediate complications  Large Joint Aspiration/Injection: R subacromial bursa    Date/Time: 3/7/2023 10:30 AM  Performed by: Yony Huerta MD  Authorized by: Yony Huerta MD     Consent Done?:  Yes (Verbal)  Indications:  Pain  Site marked: the procedure site was marked    Timeout: prior to procedure the correct patient, procedure, and site was verified    Prep: patient was prepped and draped in usual sterile fashion      Local anesthesia used?: Yes    Local anesthetic:  Lidocaine 1% without epinephrine  Ultrasonic Guidance for needle placement?: No    Location:  Shoulder  Site:  R subacromial bursa  Medications:  40 mg triamcinolone acetonide 40 mg/mL  Patient tolerance:  Patient tolerated the procedure well with no immediate complications
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
## 2023-04-18 ENCOUNTER — OFFICE VISIT (OUTPATIENT)
Dept: ORTHOPEDICS | Facility: CLINIC | Age: 68
End: 2023-04-18
Payer: MEDICARE

## 2023-04-18 VITALS — BODY MASS INDEX: 29.06 KG/M2 | HEIGHT: 63 IN | WEIGHT: 164 LBS

## 2023-04-18 DIAGNOSIS — M75.41 ROTATOR CUFF IMPINGEMENT SYNDROME, RIGHT: Primary | ICD-10-CM

## 2023-04-18 DIAGNOSIS — S73.192A TEAR OF LEFT ACETABULAR LABRUM, INITIAL ENCOUNTER: ICD-10-CM

## 2023-04-18 DIAGNOSIS — M70.62 GREATER TROCHANTERIC BURSITIS, LEFT: ICD-10-CM

## 2023-04-18 PROCEDURE — 3008F BODY MASS INDEX DOCD: CPT | Mod: CPTII,S$GLB,, | Performed by: ORTHOPAEDIC SURGERY

## 2023-04-18 PROCEDURE — 1159F MED LIST DOCD IN RCRD: CPT | Mod: CPTII,S$GLB,, | Performed by: ORTHOPAEDIC SURGERY

## 2023-04-18 PROCEDURE — 1101F PR PT FALLS ASSESS DOC 0-1 FALLS W/OUT INJ PAST YR: ICD-10-PCS | Mod: CPTII,S$GLB,, | Performed by: ORTHOPAEDIC SURGERY

## 2023-04-18 PROCEDURE — 1126F AMNT PAIN NOTED NONE PRSNT: CPT | Mod: CPTII,S$GLB,, | Performed by: ORTHOPAEDIC SURGERY

## 2023-04-18 PROCEDURE — 3288F PR FALLS RISK ASSESSMENT DOCUMENTED: ICD-10-PCS | Mod: CPTII,S$GLB,, | Performed by: ORTHOPAEDIC SURGERY

## 2023-04-18 PROCEDURE — 99213 OFFICE O/P EST LOW 20 MIN: CPT | Mod: S$GLB,,, | Performed by: ORTHOPAEDIC SURGERY

## 2023-04-18 PROCEDURE — 3008F PR BODY MASS INDEX (BMI) DOCUMENTED: ICD-10-PCS | Mod: CPTII,S$GLB,, | Performed by: ORTHOPAEDIC SURGERY

## 2023-04-18 PROCEDURE — 1159F PR MEDICATION LIST DOCUMENTED IN MEDICAL RECORD: ICD-10-PCS | Mod: CPTII,S$GLB,, | Performed by: ORTHOPAEDIC SURGERY

## 2023-04-18 PROCEDURE — 1126F PR PAIN SEVERITY QUANTIFIED, NO PAIN PRESENT: ICD-10-PCS | Mod: CPTII,S$GLB,, | Performed by: ORTHOPAEDIC SURGERY

## 2023-04-18 PROCEDURE — 1160F PR REVIEW ALL MEDS BY PRESCRIBER/CLIN PHARMACIST DOCUMENTED: ICD-10-PCS | Mod: CPTII,S$GLB,, | Performed by: ORTHOPAEDIC SURGERY

## 2023-04-18 PROCEDURE — 3288F FALL RISK ASSESSMENT DOCD: CPT | Mod: CPTII,S$GLB,, | Performed by: ORTHOPAEDIC SURGERY

## 2023-04-18 PROCEDURE — 1160F RVW MEDS BY RX/DR IN RCRD: CPT | Mod: CPTII,S$GLB,, | Performed by: ORTHOPAEDIC SURGERY

## 2023-04-18 PROCEDURE — 1101F PT FALLS ASSESS-DOCD LE1/YR: CPT | Mod: CPTII,S$GLB,, | Performed by: ORTHOPAEDIC SURGERY

## 2023-04-18 PROCEDURE — 99213 PR OFFICE/OUTPT VISIT, EST, LEVL III, 20-29 MIN: ICD-10-PCS | Mod: S$GLB,,, | Performed by: ORTHOPAEDIC SURGERY

## 2023-04-18 NOTE — PROGRESS NOTES
[facility]    Subjective:     Chief Complaint:   Chief Complaint   Patient presents with    Right Shoulder - Pain     Right shoulder injection 03/07/23, patient got some relief from the injection, she state every once in a while she has some discomfort.     Left Hip - Pain     Patient is still having hip pain, the had an injection on 03/07/23, she states that she thinks she pulled something.       Past Medical History:   Diagnosis Date    Anemia     CHF (congestive heart failure)     Coarctation of aorta     Coronary artery disease     Diastolic heart failure     DM (diabetes mellitus)     GERD (gastroesophageal reflux disease)     Hyperlipidemia     ARNAUD (obstructive sleep apnea)     Pickwickian syndrome     Pneumonia        Past Surgical History:   Procedure Laterality Date    AORTIC VALVE REPLACEMENT      BREAST LUMPECTOMY Left 2014    BREAST SURGERY      CARDIAC SURGERY      CHOLECYSTECTOMY      CORONARY ARTERY BYPASS GRAFT      x1 vessel    EYE SURGERY      HYSTERECTOMY      KNEE CARTILAGE SURGERY         Current Outpatient Medications   Medication Sig    albuterol (PROVENTIL/VENTOLIN HFA) 90 mcg/actuation inhaler albuterol sulfate HFA 90 mcg/actuation aerosol inhaler    aspirin (ECOTRIN) 325 MG EC tablet Take 325 mg by mouth.    atorvastatin (LIPITOR) 20 MG tablet atorvastatin 20 mg tablet    bisacodyL (DULCOLAX) 5 mg EC tablet Take 5 mg by mouth.    blood sugar diagnostic Strp OneTouch Ultra Blue Test Strip    calcium carbonate (OS-SYBIL) 600 mg calcium (1,500 mg) Tab Take 600 mg by mouth.    fluticasone propionate (FLONASE) 50 mcg/actuation nasal spray fluticasone propionate 50 mcg/actuation nasal spray,suspension    lisinopriL (PRINIVIL,ZESTRIL) 20 MG tablet lisinopril 20 mg tablet    nitroGLYCERIN (NITROSTAT) 0.4 MG SL tablet Place 0.4 mg under the tongue.    omeprazole (PRILOSEC) 20 MG capsule Take 20 mg by mouth once daily.    ondansetron (ZOFRAN-ODT) 4 MG TbDL ondansetron 4 mg disintegrating  tablet    warfarin (COUMADIN) 5 MG tablet Jantoven 5 mg tablet    alendronate (FOSAMAX) 70 MG tablet alendronate 70 mg tablet    allopurinoL (ZYLOPRIM) 100 MG tablet allopurinol 100 mg tablet    amoxicillin-clavulanate 875-125mg (AUGMENTIN) 875-125 mg per tablet amoxicillin 875 mg-potassium clavulanate 125 mg tablet    anastrozole (ARIMIDEX) 1 mg Tab anastrozole 1 mg tablet    ascorbic acid, vitamin C, (VITAMIN C) 100 MG tablet Take 100 mg by mouth.    azelastine (ASTELIN) 137 mcg (0.1 %) nasal spray azelastine 137 mcg (0.1 %) nasal spray aerosol    azithromycin (Z-CHANA) 250 MG tablet azithromycin 250 mg tablet    calcium carbonate-vitamin D3 600 mg(1,500mg) -800 unit Tab Take 1 tablet by mouth.    cetirizine (ZYRTEC) 10 MG tablet cetirizine 10 mg tablet    clarithromycin (BIAXIN) 500 MG tablet clarithromycin 500 mg tablet    clindamycin (CLEOCIN) 300 MG capsule clindamycin HCl 300 mg capsule    cyanocobalamin, vitamin B-12, 50 mcg tablet Take 50 mcg by mouth.    dexchlorphen-phenylephrine-DM 1-5-10 mg/5 mL Syrp Take 5 mLs by mouth every 4 (four) hours as needed. (Patient not taking: Reported on 5/11/2020)    doxycycline (MONODOX) 100 MG capsule doxycycline monohydrate 100 mg capsule    enoxaparin (LOVENOX) 100 mg/mL Syrg enoxaparin 100 mg/mL subcutaneous syringe    ergocalciferol (ERGOCALCIFEROL) 50,000 unit Cap Vitamin D2 1,250 mcg (50,000 unit) capsule    ferrous sulfate 325 (65 FE) MG EC tablet Take 325 mg by mouth.    fluocinonide 0.05% (LIDEX) 0.05 % cream fluocinonide 0.05 % topical cream    fluticasone furoate (ARNUITY ELLIPTA) 100 mcg/actuation DsDv Arnuity Ellipta 100 mcg/actuation powder for inhalation    fluticasone furoate-vilanteroL (BREO) 100-25 mcg/dose diskus inhaler Breo Ellipta 100 mcg-25 mcg/dose powder for inhalation    furosemide (LASIX) 20 MG tablet furosemide 20 mg tablet    gabapentin (NEURONTIN) 100 MG capsule gabapentin 100 mg capsule    HYDROcodone-acetaminophen (NORCO) 5-325 mg per tablet  Take 1 tablet by mouth every 6 (six) hours as needed for Pain. (Patient not taking: Reported on 4/18/2023)    isosorbide mononitrate (IMDUR) 30 MG 24 hr tablet isosorbide mononitrate ER 30 mg tablet,extended release 24 hr    lisinopriL-hydrochlorothiazide (PRINZIDE,ZESTORETIC) 20-12.5 mg per tablet lisinopril 20 mg-hydrochlorothiazide 12.5 mg tablet    magnesium citrate solution magnesium citrate oral solution    meclizine (ANTIVERT) 12.5 mg tablet meclizine 12.5 mg tablet    metFORMIN (GLUCOPHAGE) 500 MG tablet metformin 500 mg tablet    metoprolol succinate (TOPROL-XL) 25 MG 24 hr tablet metoprolol succinate ER 25 mg tablet,extended release 24 hr    metoprolol succinate (TOPROL-XL) 50 MG 24 hr tablet metoprolol succinate ER 50 mg tablet,extended release 24 hr    multivit-min/ferrous fumarate (MULTI VITAMIN ORAL) Take by mouth.    multivitamin (THERAGRAN) per tablet Take 1 tablet by mouth.    pneumoc 13-phan conj-dip cr,PF, (PREVNAR 13, PF,) 0.5 mL Syrg Prevnar 13 (PF) 0.5 mL intramuscular syringe    potassium chloride (MICRO-K) 10 MEQ CpSR potassium chloride ER 10 mEq capsule,extended release    predniSONE (DELTASONE) 20 MG tablet prednisone 20 mg tablet    protein Powd Take by mouth.    SYMBICORT 160-4.5 mcg/actuation HFAA INHALE 2 PUFFS INTO THE LUNGS EVERY 12 (TWELVE) HOURS. CONTROLLER  (Patient not taking: Reported on 4/18/2023)    thiamine 250 MG tablet Take 250 mg by mouth.    traMADoL (ULTRAM) 50 mg tablet tramadol 50 mg tablet    triamcinolone acetonide (NASACORT NASL) by Nasal route.    valACYclovir (VALTREX) 1000 MG tablet valacyclovir 1 gram tablet     Current Facility-Administered Medications   Medication    acetaminophen tablet 650 mg    acetaminophen tablet 650 mg    albuterol inhaler 2 puff    albuterol inhaler 2 puff    diphenhydrAMINE injection 25 mg    diphenhydrAMINE injection 25 mg    EPINEPHrine (EPIPEN) 0.3 mg/0.3 mL pen injection 0.3 mg    EPINEPHrine 0.1 mg/mL injection 0.3 mg     methylPREDNISolone sodium succinate injection 40 mg    methylPREDNISolone sodium succinate injection 40 mg    ondansetron disintegrating tablet 4 mg    ondansetron injection 4 mg    sodium chloride 0.9% 500 mL flush bag    sodium chloride 0.9% 500 mL flush bag    sodium chloride 0.9% flush 10 mL    sodium chloride 0.9% flush 10 mL       Review of patient's allergies indicates:   Allergen Reactions    Iodinated contrast media Itching and Other (See Comments)     Throat itching     Vancomycin analogues Itching    Sulfa (sulfonamide antibiotics) Itching    Codeine Nausea Only    Avelox [moxifloxacin] Palpitations    Pcn [penicillins] Other (See Comments)     Bumps in mouth after taking for a while.        Family History   Problem Relation Age of Onset    Heart disease Mother     Alzheimer's disease Mother     Heart disease Father     Diabetes Father     Kidney disease Father     Breast cancer Paternal Aunt        Social History     Socioeconomic History    Marital status:     Number of children: 3   Occupational History    Occupation: , retired LPN   Tobacco Use    Smoking status: Former     Packs/day: 2.00     Years: 30.00     Pack years: 60.00     Types: Cigarettes     Start date: 1985     Quit date: 6/3/2005     Years since quittin.8    Smokeless tobacco: Never   Substance and Sexual Activity    Alcohol use: No    Drug use: No    Sexual activity: Never       History of present illness:  Patient comes in today for the left hip.  She continues complain of significant left hip and groin pain.  She has had an injection in the trochanteric region.  She has had physical therapy.  She is not improving      Review of Systems:    Constitution: Negative for chills, fever, and sweats.  Negative for unexplained weight loss.    HENT:  Negative for headaches and blurry vision.    Cardiovascular:Negative for chest pain or irregular heart beat. Negative for hypertension.    Respiratory:  Negative  for cough and shortness of breath.    Gastrointestinal: Negative for abdominal pain, heartburn, melena, nausea, and vomitting.    Genitourinary:  Negative bladder incontinence and dysuria.    Musculoskeletal:  See HPI for details.     Neurological: Negative for numbness.    Psychiatric/Behavioral: Negative for depression.  The patient is not nervous/anxious.      Endocrine: Negative for polyuria    Hematologic/Lymphatic: Negative for bleeding problem.  Does not bruise/bleed easily.    Skin: Negative for poor would healing and rash    Objective:      Physical Examination:    Vital Signs:  There were no vitals filed for this visit.    Body mass index is 29.05 kg/m².    This a well-developed, well nourished patient in no acute distress.  They are alert and oriented and cooperative to examination.        Patient has pain with internal external rotation of the hip.  She has negative straight leg raises.  She has mild tenderness over the trochanteric region.  EHL is intact deep tendon reflexes are intact  Pertinent New Results:    XRAY Report / Interpretation:       Assessment/Plan:      Left hip and groin pain despite conservative care including injection and therapy.  I therefore ordered an MRI of the left hip to try to establish the diagnosis.  Believe she has possible labral pathology.  I will see her back with that data      This note was created using Dragon voice recognition software that occasionally misinterpreted phrases or words.

## 2023-04-28 ENCOUNTER — HOSPITAL ENCOUNTER (OUTPATIENT)
Dept: RADIOLOGY | Facility: HOSPITAL | Age: 68
Discharge: HOME | End: 2023-04-28
Attending: ORTHOPAEDIC SURGERY
Payer: MEDICARE

## 2023-04-28 DIAGNOSIS — S73.192A TEAR OF LEFT ACETABULAR LABRUM, INITIAL ENCOUNTER: ICD-10-CM

## 2023-04-28 DIAGNOSIS — M70.62 GREATER TROCHANTERIC BURSITIS, LEFT: ICD-10-CM

## 2023-04-28 PROCEDURE — 73721 MRI JNT OF LWR EXTRE W/O DYE: CPT | Mod: TC,PO,LT

## 2023-05-11 ENCOUNTER — TELEPHONE (OUTPATIENT)
Dept: ORTHOPEDICS | Facility: CLINIC | Age: 68
End: 2023-05-11

## 2023-05-11 DIAGNOSIS — S73.192A TEAR OF LEFT ACETABULAR LABRUM, INITIAL ENCOUNTER: ICD-10-CM

## 2023-05-11 DIAGNOSIS — M70.62 GREATER TROCHANTERIC BURSITIS, LEFT: Primary | ICD-10-CM

## 2023-05-16 ENCOUNTER — HOSPITAL ENCOUNTER (OUTPATIENT)
Dept: RADIOLOGY | Facility: HOSPITAL | Age: 68
Discharge: HOME | End: 2023-05-16
Attending: ORTHOPAEDIC SURGERY
Payer: MEDICARE

## 2023-05-16 DIAGNOSIS — S73.192A TEAR OF LEFT ACETABULAR LABRUM, INITIAL ENCOUNTER: ICD-10-CM

## 2023-05-16 DIAGNOSIS — M70.62 GREATER TROCHANTERIC BURSITIS, LEFT: ICD-10-CM

## 2023-05-16 PROCEDURE — 73718 MRI LOWER EXTREMITY W/O DYE: CPT | Mod: TC,PO,LT

## 2023-05-30 ENCOUNTER — OFFICE VISIT (OUTPATIENT)
Dept: ORTHOPEDICS | Facility: CLINIC | Age: 68
End: 2023-05-30
Payer: MEDICARE

## 2023-05-30 VITALS
SYSTOLIC BLOOD PRESSURE: 134 MMHG | DIASTOLIC BLOOD PRESSURE: 82 MMHG | HEIGHT: 63 IN | BODY MASS INDEX: 29.06 KG/M2 | WEIGHT: 164 LBS

## 2023-05-30 DIAGNOSIS — M24.152 DEGENERATIVE TEAR OF ACETABULAR LABRUM OF LEFT HIP: Primary | ICD-10-CM

## 2023-05-30 PROCEDURE — 1159F MED LIST DOCD IN RCRD: CPT | Mod: CPTII,S$GLB,, | Performed by: ORTHOPAEDIC SURGERY

## 2023-05-30 PROCEDURE — 99213 OFFICE O/P EST LOW 20 MIN: CPT | Mod: S$GLB,,, | Performed by: ORTHOPAEDIC SURGERY

## 2023-05-30 PROCEDURE — 3079F PR MOST RECENT DIASTOLIC BLOOD PRESSURE 80-89 MM HG: ICD-10-PCS | Mod: CPTII,S$GLB,, | Performed by: ORTHOPAEDIC SURGERY

## 2023-05-30 PROCEDURE — 1101F PR PT FALLS ASSESS DOC 0-1 FALLS W/OUT INJ PAST YR: ICD-10-PCS | Mod: CPTII,S$GLB,, | Performed by: ORTHOPAEDIC SURGERY

## 2023-05-30 PROCEDURE — 3079F DIAST BP 80-89 MM HG: CPT | Mod: CPTII,S$GLB,, | Performed by: ORTHOPAEDIC SURGERY

## 2023-05-30 PROCEDURE — 1160F PR REVIEW ALL MEDS BY PRESCRIBER/CLIN PHARMACIST DOCUMENTED: ICD-10-PCS | Mod: CPTII,S$GLB,, | Performed by: ORTHOPAEDIC SURGERY

## 2023-05-30 PROCEDURE — 1125F AMNT PAIN NOTED PAIN PRSNT: CPT | Mod: CPTII,S$GLB,, | Performed by: ORTHOPAEDIC SURGERY

## 2023-05-30 PROCEDURE — 1125F PR PAIN SEVERITY QUANTIFIED, PAIN PRESENT: ICD-10-PCS | Mod: CPTII,S$GLB,, | Performed by: ORTHOPAEDIC SURGERY

## 2023-05-30 PROCEDURE — 4010F PR ACE/ARB THEARPY RXD/TAKEN: ICD-10-PCS | Mod: CPTII,S$GLB,, | Performed by: ORTHOPAEDIC SURGERY

## 2023-05-30 PROCEDURE — 4010F ACE/ARB THERAPY RXD/TAKEN: CPT | Mod: CPTII,S$GLB,, | Performed by: ORTHOPAEDIC SURGERY

## 2023-05-30 PROCEDURE — 3008F BODY MASS INDEX DOCD: CPT | Mod: CPTII,S$GLB,, | Performed by: ORTHOPAEDIC SURGERY

## 2023-05-30 PROCEDURE — 99213 PR OFFICE/OUTPT VISIT, EST, LEVL III, 20-29 MIN: ICD-10-PCS | Mod: S$GLB,,, | Performed by: ORTHOPAEDIC SURGERY

## 2023-05-30 PROCEDURE — 3075F SYST BP GE 130 - 139MM HG: CPT | Mod: CPTII,S$GLB,, | Performed by: ORTHOPAEDIC SURGERY

## 2023-05-30 PROCEDURE — 3288F PR FALLS RISK ASSESSMENT DOCUMENTED: ICD-10-PCS | Mod: CPTII,S$GLB,, | Performed by: ORTHOPAEDIC SURGERY

## 2023-05-30 PROCEDURE — 1101F PT FALLS ASSESS-DOCD LE1/YR: CPT | Mod: CPTII,S$GLB,, | Performed by: ORTHOPAEDIC SURGERY

## 2023-05-30 PROCEDURE — 3008F PR BODY MASS INDEX (BMI) DOCUMENTED: ICD-10-PCS | Mod: CPTII,S$GLB,, | Performed by: ORTHOPAEDIC SURGERY

## 2023-05-30 PROCEDURE — 3288F FALL RISK ASSESSMENT DOCD: CPT | Mod: CPTII,S$GLB,, | Performed by: ORTHOPAEDIC SURGERY

## 2023-05-30 PROCEDURE — 1159F PR MEDICATION LIST DOCUMENTED IN MEDICAL RECORD: ICD-10-PCS | Mod: CPTII,S$GLB,, | Performed by: ORTHOPAEDIC SURGERY

## 2023-05-30 PROCEDURE — 3075F PR MOST RECENT SYSTOLIC BLOOD PRESS GE 130-139MM HG: ICD-10-PCS | Mod: CPTII,S$GLB,, | Performed by: ORTHOPAEDIC SURGERY

## 2023-05-30 PROCEDURE — 1160F RVW MEDS BY RX/DR IN RCRD: CPT | Mod: CPTII,S$GLB,, | Performed by: ORTHOPAEDIC SURGERY

## 2023-05-30 NOTE — PROGRESS NOTES
[facility]    Subjective:     Chief Complaint:   Chief Complaint   Patient presents with    Left Hip - Pain     Here for MRI results LT him/LT femur, pain varies       Past Medical History:   Diagnosis Date    Anemia     CHF (congestive heart failure)     Coarctation of aorta     Coronary artery disease     Diastolic heart failure     DM (diabetes mellitus)     GERD (gastroesophageal reflux disease)     Hyperlipidemia     ARNAUD (obstructive sleep apnea)     Pickwickian syndrome     Pneumonia        Past Surgical History:   Procedure Laterality Date    AORTIC VALVE REPLACEMENT      BREAST LUMPECTOMY Left 2014    BREAST SURGERY      CARDIAC SURGERY      CHOLECYSTECTOMY      CORONARY ARTERY BYPASS GRAFT      x1 vessel    EYE SURGERY      HYSTERECTOMY      KNEE CARTILAGE SURGERY         Current Outpatient Medications   Medication Sig    albuterol (PROVENTIL/VENTOLIN HFA) 90 mcg/actuation inhaler albuterol sulfate HFA 90 mcg/actuation aerosol inhaler    aspirin (ECOTRIN) 325 MG EC tablet Take 325 mg by mouth.    atorvastatin (LIPITOR) 20 MG tablet atorvastatin 20 mg tablet    bisacodyL (DULCOLAX) 5 mg EC tablet Take 5 mg by mouth.    blood sugar diagnostic Strp OneTouch Ultra Blue Test Strip    calcium carbonate (OS-SYBIL) 600 mg calcium (1,500 mg) Tab Take 600 mg by mouth.    fluticasone propionate (FLONASE) 50 mcg/actuation nasal spray fluticasone propionate 50 mcg/actuation nasal spray,suspension    lisinopriL (PRINIVIL,ZESTRIL) 20 MG tablet lisinopril 20 mg tablet    nitroGLYCERIN (NITROSTAT) 0.4 MG SL tablet Place 0.4 mg under the tongue.    omeprazole (PRILOSEC) 20 MG capsule Take 20 mg by mouth once daily.    ondansetron (ZOFRAN-ODT) 4 MG TbDL ondansetron 4 mg disintegrating tablet    warfarin (COUMADIN) 5 MG tablet Jantoven 5 mg tablet    alendronate (FOSAMAX) 70 MG tablet alendronate 70 mg tablet    allopurinoL (ZYLOPRIM) 100 MG tablet allopurinol 100 mg tablet    amoxicillin-clavulanate 875-125mg  (AUGMENTIN) 875-125 mg per tablet amoxicillin 875 mg-potassium clavulanate 125 mg tablet    anastrozole (ARIMIDEX) 1 mg Tab anastrozole 1 mg tablet    ascorbic acid, vitamin C, (VITAMIN C) 100 MG tablet Take 100 mg by mouth.    azelastine (ASTELIN) 137 mcg (0.1 %) nasal spray azelastine 137 mcg (0.1 %) nasal spray aerosol    azithromycin (Z-CHANA) 250 MG tablet azithromycin 250 mg tablet    calcium carbonate-vitamin D3 600 mg(1,500mg) -800 unit Tab Take 1 tablet by mouth.    cetirizine (ZYRTEC) 10 MG tablet cetirizine 10 mg tablet    clarithromycin (BIAXIN) 500 MG tablet clarithromycin 500 mg tablet    clindamycin (CLEOCIN) 300 MG capsule clindamycin HCl 300 mg capsule    cyanocobalamin, vitamin B-12, 50 mcg tablet Take 50 mcg by mouth.    dexchlorphen-phenylephrine-DM 1-5-10 mg/5 mL Syrp Take 5 mLs by mouth every 4 (four) hours as needed. (Patient not taking: Reported on 5/11/2020)    doxycycline (MONODOX) 100 MG capsule doxycycline monohydrate 100 mg capsule    enoxaparin (LOVENOX) 100 mg/mL Syrg enoxaparin 100 mg/mL subcutaneous syringe    ergocalciferol (ERGOCALCIFEROL) 50,000 unit Cap Vitamin D2 1,250 mcg (50,000 unit) capsule    ferrous sulfate 325 (65 FE) MG EC tablet Take 325 mg by mouth.    fluocinonide 0.05% (LIDEX) 0.05 % cream fluocinonide 0.05 % topical cream    fluticasone furoate (ARNUITY ELLIPTA) 100 mcg/actuation DsDv Arnuity Ellipta 100 mcg/actuation powder for inhalation    fluticasone furoate-vilanteroL (BREO) 100-25 mcg/dose diskus inhaler Breo Ellipta 100 mcg-25 mcg/dose powder for inhalation    furosemide (LASIX) 20 MG tablet furosemide 20 mg tablet    gabapentin (NEURONTIN) 100 MG capsule gabapentin 100 mg capsule    HYDROcodone-acetaminophen (NORCO) 5-325 mg per tablet Take 1 tablet by mouth every 6 (six) hours as needed for Pain. (Patient not taking: Reported on 4/18/2023)    isosorbide mononitrate (IMDUR) 30 MG 24 hr tablet isosorbide mononitrate ER 30 mg tablet,extended release 24 hr     lisinopriL-hydrochlorothiazide (PRINZIDE,ZESTORETIC) 20-12.5 mg per tablet lisinopril 20 mg-hydrochlorothiazide 12.5 mg tablet    magnesium citrate solution magnesium citrate oral solution    meclizine (ANTIVERT) 12.5 mg tablet meclizine 12.5 mg tablet    metFORMIN (GLUCOPHAGE) 500 MG tablet metformin 500 mg tablet    metoprolol succinate (TOPROL-XL) 25 MG 24 hr tablet metoprolol succinate ER 25 mg tablet,extended release 24 hr    metoprolol succinate (TOPROL-XL) 50 MG 24 hr tablet metoprolol succinate ER 50 mg tablet,extended release 24 hr    multivit-min/ferrous fumarate (MULTI VITAMIN ORAL) Take by mouth.    multivitamin (THERAGRAN) per tablet Take 1 tablet by mouth.    pneumoc 13-phan conj-dip cr,PF, (PREVNAR 13, PF,) 0.5 mL Syrg Prevnar 13 (PF) 0.5 mL intramuscular syringe    potassium chloride (MICRO-K) 10 MEQ CpSR potassium chloride ER 10 mEq capsule,extended release    predniSONE (DELTASONE) 20 MG tablet prednisone 20 mg tablet    protein Powd Take by mouth.    SYMBICORT 160-4.5 mcg/actuation HFAA INHALE 2 PUFFS INTO THE LUNGS EVERY 12 (TWELVE) HOURS. CONTROLLER  (Patient not taking: Reported on 4/18/2023)    thiamine 250 MG tablet Take 250 mg by mouth.    traMADoL (ULTRAM) 50 mg tablet tramadol 50 mg tablet    triamcinolone acetonide (NASACORT NASL) by Nasal route.    valACYclovir (VALTREX) 1000 MG tablet valacyclovir 1 gram tablet     Current Facility-Administered Medications   Medication    acetaminophen tablet 650 mg    acetaminophen tablet 650 mg    albuterol inhaler 2 puff    albuterol inhaler 2 puff    diphenhydrAMINE injection 25 mg    diphenhydrAMINE injection 25 mg    EPINEPHrine (EPIPEN) 0.3 mg/0.3 mL pen injection 0.3 mg    EPINEPHrine 0.1 mg/mL injection 0.3 mg    methylPREDNISolone sodium succinate injection 40 mg    methylPREDNISolone sodium succinate injection 40 mg    ondansetron disintegrating tablet 4 mg    ondansetron injection 4 mg    sodium chloride 0.9% 500 mL flush bag    sodium  chloride 0.9% 500 mL flush bag    sodium chloride 0.9% flush 10 mL    sodium chloride 0.9% flush 10 mL       Review of patient's allergies indicates:   Allergen Reactions    Iodinated contrast media Itching and Other (See Comments)     Throat itching     Vancomycin analogues Itching    Sulfa (sulfonamide antibiotics) Itching    Codeine Nausea Only    Avelox [moxifloxacin] Palpitations    Pcn [penicillins] Other (See Comments)     Bumps in mouth after taking for a while.        Family History   Problem Relation Age of Onset    Heart disease Mother     Alzheimer's disease Mother     Heart disease Father     Diabetes Father     Kidney disease Father     Breast cancer Paternal Aunt        Social History     Socioeconomic History    Marital status:     Number of children: 3   Occupational History    Occupation: , retired LPN   Tobacco Use    Smoking status: Former     Packs/day: 2.00     Years: 30.00     Pack years: 60.00     Types: Cigarettes     Start date: 1985     Quit date: 6/3/2005     Years since quittin.0    Smokeless tobacco: Never   Substance and Sexual Activity    Alcohol use: No    Drug use: No    Sexual activity: Never       History of present illness:  Patient returns today for her left hip.  Pain is anterior just distal to the groin crease.  She continues to complain of intermittent discomfort.  She has had an MRI of her hip proximal femur and pelvis.      Review of Systems:    Constitution: Negative for chills, fever, and sweats.  Negative for unexplained weight loss.    HENT:  Negative for headaches and blurry vision.    Cardiovascular:Negative for chest pain or irregular heart beat. Negative for hypertension.    Respiratory:  Negative for cough and shortness of breath.    Gastrointestinal: Negative for abdominal pain, heartburn, melena, nausea, and vomitting.    Genitourinary:  Negative bladder incontinence and dysuria.    Musculoskeletal:  See HPI for details.      Neurological: Negative for numbness.    Psychiatric/Behavioral: Negative for depression.  The patient is not nervous/anxious.      Endocrine: Negative for polyuria    Hematologic/Lymphatic: Negative for bleeding problem.  Does not bruise/bleed easily.    Skin: Negative for poor would healing and rash    Objective:      Physical Examination:    Vital Signs:    Vitals:    05/30/23 1026   BP: 134/82       Body mass index is 29.05 kg/m².    This a well-developed, well nourished patient in no acute distress.  They are alert and oriented and cooperative to examination.        Patient has full range of motion of her hip today without difficulty.  No obvious pain to palpation around the trochanteric region negative straight leg raises.  EHL is intact deep tendon reflexes are intact no palpable masses or defects.  Pertinent New Results:  MRI of the left femur and the MRI of the pelvis is reviewed.  The MRI is positive for some degenerative changes within the labrum.  Additionally she is noted to have a lipoma around the hamstrings.  XRAY Report / Interpretation:       Assessment/Plan:      Intermittent groin pain.  My suspicion is she has a degenerative tear of the labrum.  I do not believe the lipoma is causing the symptoms.  No other pathology is noted.  I recommend she continue doing physical therapy.  Will check her back in approximately 6 weeks.  We should consider an intra-articular her steroid injection if she is not better in 6 weeks    This note was created using Dragon voice recognition software that occasionally misinterpreted phrases or words.

## 2023-08-24 NOTE — PROGRESS NOTES
OFFICE NOTE    This is a 62-year-old lady with progressive shortness of breath.  She has had   recent angiography and echocardiography showing aortic stenosis.  She was   referred for consideration of aortic valve replacement.  She has had previous   coronary bypass grafting with a patent vein graft to the left anterior   descending coronary artery.  She has had a previous left thoracotomy for   correction of coarctation.  The  recent echocardiogram showed a bicuspid valve.    Her past history is well documented.  Medicines are noted.  She is taking   aspirin and Lipitor.  Her other issues are part of the medical record as well.    REVIEW OF SYSTEMS:  Pertinent for shortness of breath with minimal exertion.    She is not a smoker.  She quit smoking approximately a year ago.  She has no   other pertinent family history.    PHYSICAL EXAMINATION:  VITAL SIGNS:  Stable.  GENERAL:  She is in no obvious distress.  HEENT:  Pupils equal, round, and reactive to light.  Nose and throat are clear.  NECK:  Supple.  CHEST:  Clear to auscultation.  HEART:  In a regular rate and rhythm with murmur consistent with aortic   stenosis.  ABDOMEN:  Benign.    EXTREMITIES:  Perfusion to the legs and feet is satisfactory.    At this point, she has symptomatic aortic stenosis.  We will plan for redo   surgery and aortic valve replacement at high risk.      EB/NICOLLE  dd: 06/21/2018 10:04:29 (CDT)  td: 06/22/2018 01:08:05 (CDT)  Doc ID   #[id number]  Job ID #[id number]    CC: Timmy Montemayor MD      
Lives alone, sister lives in building with her, completely independent

## 2024-04-02 DIAGNOSIS — Z78.0 ASYMPTOMATIC MENOPAUSAL STATE: Primary | ICD-10-CM

## 2024-04-03 DIAGNOSIS — Z12.31 ENCOUNTER FOR SCREENING MAMMOGRAM FOR MALIGNANT NEOPLASM OF BREAST: Primary | ICD-10-CM

## 2024-04-23 ENCOUNTER — HOSPITAL ENCOUNTER (OUTPATIENT)
Dept: RADIOLOGY | Facility: HOSPITAL | Age: 69
Discharge: HOME | End: 2024-04-23
Attending: INTERNAL MEDICINE
Payer: MEDICARE

## 2024-04-23 DIAGNOSIS — Z78.0 ASYMPTOMATIC MENOPAUSAL STATE: ICD-10-CM

## 2024-04-23 DIAGNOSIS — Z12.31 ENCOUNTER FOR SCREENING MAMMOGRAM FOR MALIGNANT NEOPLASM OF BREAST: ICD-10-CM

## 2024-04-23 PROCEDURE — 77080 DXA BONE DENSITY AXIAL: CPT | Mod: TC,PO

## 2024-04-23 PROCEDURE — 77063 BREAST TOMOSYNTHESIS BI: CPT | Mod: 26,,, | Performed by: RADIOLOGY

## 2024-04-23 PROCEDURE — 77080 DXA BONE DENSITY AXIAL: CPT | Mod: 26,,, | Performed by: RADIOLOGY

## 2024-04-23 PROCEDURE — 77067 SCR MAMMO BI INCL CAD: CPT | Mod: 26,,, | Performed by: RADIOLOGY

## 2024-04-23 PROCEDURE — 77067 SCR MAMMO BI INCL CAD: CPT | Mod: TC,PO

## 2024-07-30 ENCOUNTER — HOSPITAL ENCOUNTER (OUTPATIENT)
Dept: RADIOLOGY | Facility: HOSPITAL | Age: 69
Discharge: HOME | End: 2024-07-30
Attending: ORTHOPAEDIC SURGERY
Payer: MEDICARE

## 2024-07-30 ENCOUNTER — OFFICE VISIT (OUTPATIENT)
Dept: ORTHOPEDICS | Facility: CLINIC | Age: 69
End: 2024-07-30
Payer: MEDICARE

## 2024-07-30 VITALS — HEIGHT: 63 IN | WEIGHT: 170 LBS | BODY MASS INDEX: 30.12 KG/M2

## 2024-07-30 DIAGNOSIS — M47.816 LUMBAR FACET ARTHROPATHY: ICD-10-CM

## 2024-07-30 DIAGNOSIS — M70.61 GREATER TROCHANTERIC BURSITIS, RIGHT: ICD-10-CM

## 2024-07-30 DIAGNOSIS — M47.816 LUMBAR SPONDYLOSIS: ICD-10-CM

## 2024-07-30 DIAGNOSIS — M51.36 DISC DEGENERATION, LUMBAR: Primary | ICD-10-CM

## 2024-07-30 PROCEDURE — 99999 PR PBB SHADOW E&M-EST. PATIENT-LVL V: CPT | Mod: PBBFAC,,, | Performed by: ORTHOPAEDIC SURGERY

## 2024-07-30 PROCEDURE — 72170 X-RAY EXAM OF PELVIS: CPT | Mod: 26,,, | Performed by: RADIOLOGY

## 2024-07-30 PROCEDURE — 99213 OFFICE O/P EST LOW 20 MIN: CPT | Mod: 25,S$GLB,, | Performed by: ORTHOPAEDIC SURGERY

## 2024-07-30 PROCEDURE — 3008F BODY MASS INDEX DOCD: CPT | Mod: CPTII,S$GLB,, | Performed by: ORTHOPAEDIC SURGERY

## 2024-07-30 PROCEDURE — 4010F ACE/ARB THERAPY RXD/TAKEN: CPT | Mod: CPTII,S$GLB,, | Performed by: ORTHOPAEDIC SURGERY

## 2024-07-30 PROCEDURE — 72100 X-RAY EXAM L-S SPINE 2/3 VWS: CPT | Mod: TC,PN

## 2024-07-30 PROCEDURE — 1159F MED LIST DOCD IN RCRD: CPT | Mod: CPTII,S$GLB,, | Performed by: ORTHOPAEDIC SURGERY

## 2024-07-30 PROCEDURE — 1125F AMNT PAIN NOTED PAIN PRSNT: CPT | Mod: CPTII,S$GLB,, | Performed by: ORTHOPAEDIC SURGERY

## 2024-07-30 PROCEDURE — 72100 X-RAY EXAM L-S SPINE 2/3 VWS: CPT | Mod: 26,,, | Performed by: RADIOLOGY

## 2024-07-30 PROCEDURE — 1160F RVW MEDS BY RX/DR IN RCRD: CPT | Mod: CPTII,S$GLB,, | Performed by: ORTHOPAEDIC SURGERY

## 2024-07-30 PROCEDURE — 72170 X-RAY EXAM OF PELVIS: CPT | Mod: TC,PN

## 2024-07-30 PROCEDURE — 1101F PT FALLS ASSESS-DOCD LE1/YR: CPT | Mod: CPTII,S$GLB,, | Performed by: ORTHOPAEDIC SURGERY

## 2024-07-30 PROCEDURE — 3288F FALL RISK ASSESSMENT DOCD: CPT | Mod: CPTII,S$GLB,, | Performed by: ORTHOPAEDIC SURGERY

## 2024-07-30 PROCEDURE — 20610 DRAIN/INJ JOINT/BURSA W/O US: CPT | Mod: RT,S$GLB,, | Performed by: ORTHOPAEDIC SURGERY

## 2024-07-30 RX ORDER — TRIAMCINOLONE ACETONIDE 40 MG/ML
40 INJECTION, SUSPENSION INTRA-ARTICULAR; INTRAMUSCULAR
Status: DISCONTINUED | OUTPATIENT
Start: 2024-07-30 | End: 2024-07-30 | Stop reason: HOSPADM

## 2024-07-30 RX ADMIN — TRIAMCINOLONE ACETONIDE 40 MG: 40 INJECTION, SUSPENSION INTRA-ARTICULAR; INTRAMUSCULAR at 01:07

## 2024-07-30 NOTE — PROCEDURES
Large Joint Aspiration/Injection: R greater trochanteric bursa    Date/Time: 7/30/2024 1:00 PM    Performed by: Yony Huerta MD  Authorized by: Yony Huerta MD    Consent Done?:  Yes (Verbal)  Indications:  Pain  Site marked: the procedure site was marked    Timeout: prior to procedure the correct patient, procedure, and site was verified    Prep: patient was prepped and draped in usual sterile fashion      Local anesthesia used?: Yes    Local anesthetic:  Lidocaine 1% without epinephrine    Details:  Needle Size:  25 G  Ultrasonic Guidance for needle placement?: No    Location:  Hip  Site:  R greater trochanteric bursa  Medications:  40 mg triamcinolone acetonide 40 mg/mL  Patient tolerance:  Patient tolerated the procedure well with no immediate complications

## 2024-07-30 NOTE — PROGRESS NOTES
[facility]    Subjective:     Chief Complaint:   Chief Complaint   Patient presents with    Right Hip - Pain     Patient is here with complaints of right hip pain x 2 months and progressively getting worse. Has burning and shooting pain as well as sensitive to the touch        Past Medical History:   Diagnosis Date    Anemia     Breast cancer 2014    left    CHF (congestive heart failure)     Coarctation of aorta     Coronary artery disease     Diastolic heart failure     DM (diabetes mellitus)     GERD (gastroesophageal reflux disease)     Hyperlipidemia     ARNAUD (obstructive sleep apnea)     Pickwickian syndrome     Pneumonia        Past Surgical History:   Procedure Laterality Date    AORTIC VALVE REPLACEMENT      BREAST LUMPECTOMY Left 2014    BREAST SURGERY      CARDIAC SURGERY      CHOLECYSTECTOMY      CORONARY ARTERY BYPASS GRAFT      x1 vessel    EYE SURGERY      HYSTERECTOMY      KNEE CARTILAGE SURGERY         Current Outpatient Medications   Medication Sig    albuterol (PROVENTIL/VENTOLIN HFA) 90 mcg/actuation inhaler albuterol sulfate HFA 90 mcg/actuation aerosol inhaler    aspirin (ECOTRIN) 325 MG EC tablet Take 325 mg by mouth.    atorvastatin (LIPITOR) 20 MG tablet atorvastatin 20 mg tablet    bisacodyL (DULCOLAX) 5 mg EC tablet Take 5 mg by mouth.    blood sugar diagnostic Strp OneTouch Ultra Blue Test Strip    calcium carbonate (OS-SYBIL) 600 mg calcium (1,500 mg) Tab Take 600 mg by mouth.    fluticasone propionate (FLONASE) 50 mcg/actuation nasal spray fluticasone propionate 50 mcg/actuation nasal spray,suspension    lisinopriL (PRINIVIL,ZESTRIL) 20 MG tablet lisinopril 20 mg tablet    nitroGLYCERIN (NITROSTAT) 0.4 MG SL tablet Place 0.4 mg under the tongue.    omeprazole (PRILOSEC) 20 MG capsule Take 20 mg by mouth once daily.    ondansetron (ZOFRAN-ODT) 4 MG TbDL ondansetron 4 mg disintegrating tablet    warfarin (COUMADIN) 5 MG tablet Jantoven 5 mg tablet    alendronate (FOSAMAX) 70 MG  tablet alendronate 70 mg tablet    allopurinoL (ZYLOPRIM) 100 MG tablet allopurinol 100 mg tablet    amoxicillin-clavulanate 875-125mg (AUGMENTIN) 875-125 mg per tablet amoxicillin 875 mg-potassium clavulanate 125 mg tablet    anastrozole (ARIMIDEX) 1 mg Tab anastrozole 1 mg tablet    ascorbic acid, vitamin C, (VITAMIN C) 100 MG tablet Take 100 mg by mouth.    azelastine (ASTELIN) 137 mcg (0.1 %) nasal spray azelastine 137 mcg (0.1 %) nasal spray aerosol    azithromycin (Z-CHANA) 250 MG tablet azithromycin 250 mg tablet    calcium carbonate-vitamin D3 600 mg(1,500mg) -800 unit Tab Take 1 tablet by mouth.    cetirizine (ZYRTEC) 10 MG tablet cetirizine 10 mg tablet    clarithromycin (BIAXIN) 500 MG tablet clarithromycin 500 mg tablet    clindamycin (CLEOCIN) 300 MG capsule clindamycin HCl 300 mg capsule    cyanocobalamin, vitamin B-12, 50 mcg tablet Take 50 mcg by mouth.    dexchlorphen-phenylephrine-DM 1-5-10 mg/5 mL Syrp Take 5 mLs by mouth every 4 (four) hours as needed. (Patient not taking: Reported on 5/11/2020)    doxycycline (MONODOX) 100 MG capsule doxycycline monohydrate 100 mg capsule    enoxaparin (LOVENOX) 100 mg/mL Syrg enoxaparin 100 mg/mL subcutaneous syringe    ergocalciferol (ERGOCALCIFEROL) 50,000 unit Cap Vitamin D2 1,250 mcg (50,000 unit) capsule    ferrous sulfate 325 (65 FE) MG EC tablet Take 325 mg by mouth.    fluocinonide 0.05% (LIDEX) 0.05 % cream fluocinonide 0.05 % topical cream    fluticasone furoate (ARNUITY ELLIPTA) 100 mcg/actuation DsDv Arnuity Ellipta 100 mcg/actuation powder for inhalation    fluticasone furoate-vilanteroL (BREO) 100-25 mcg/dose diskus inhaler Breo Ellipta 100 mcg-25 mcg/dose powder for inhalation    furosemide (LASIX) 20 MG tablet furosemide 20 mg tablet    gabapentin (NEURONTIN) 100 MG capsule gabapentin 100 mg capsule    HYDROcodone-acetaminophen (NORCO) 5-325 mg per tablet Take 1 tablet by mouth every 6 (six) hours as needed for Pain. (Patient not taking: Reported  on 4/18/2023)    isosorbide mononitrate (IMDUR) 30 MG 24 hr tablet isosorbide mononitrate ER 30 mg tablet,extended release 24 hr    lisinopriL-hydrochlorothiazide (PRINZIDE,ZESTORETIC) 20-12.5 mg per tablet lisinopril 20 mg-hydrochlorothiazide 12.5 mg tablet    magnesium citrate solution magnesium citrate oral solution    meclizine (ANTIVERT) 12.5 mg tablet meclizine 12.5 mg tablet    metFORMIN (GLUCOPHAGE) 500 MG tablet metformin 500 mg tablet    metoprolol succinate (TOPROL-XL) 25 MG 24 hr tablet metoprolol succinate ER 25 mg tablet,extended release 24 hr    metoprolol succinate (TOPROL-XL) 50 MG 24 hr tablet metoprolol succinate ER 50 mg tablet,extended release 24 hr    multivit-min/ferrous fumarate (MULTI VITAMIN ORAL) Take by mouth.    multivitamin (THERAGRAN) per tablet Take 1 tablet by mouth.    pneumoc 13-phan conj-dip cr,PF, (PREVNAR 13, PF,) 0.5 mL Syrg Prevnar 13 (PF) 0.5 mL intramuscular syringe    potassium chloride (MICRO-K) 10 MEQ CpSR potassium chloride ER 10 mEq capsule,extended release    predniSONE (DELTASONE) 20 MG tablet prednisone 20 mg tablet    protein Powd Take by mouth.    semaglutide (OZEMPIC) 0.25 mg or 0.5 mg (2 mg/3 mL) pen injector INJECT 0.5 MG ONCE WEEKLY Dx e11.9    SYMBICORT 160-4.5 mcg/actuation HFAA INHALE 2 PUFFS INTO THE LUNGS EVERY 12 (TWELVE) HOURS. CONTROLLER  (Patient not taking: Reported on 4/18/2023)    thiamine 250 MG tablet Take 250 mg by mouth.    traMADoL (ULTRAM) 50 mg tablet tramadol 50 mg tablet    triamcinolone acetonide (NASACORT NASL) by Nasal route.    valACYclovir (VALTREX) 1000 MG tablet valacyclovir 1 gram tablet     Current Facility-Administered Medications   Medication    acetaminophen tablet 650 mg    acetaminophen tablet 650 mg    albuterol inhaler 2 puff    albuterol inhaler 2 puff    diphenhydrAMINE injection 25 mg    diphenhydrAMINE injection 25 mg    EPINEPHrine (EPIPEN) 0.3 mg/0.3 mL pen injection 0.3 mg    EPINEPHrine 0.1 mg/mL injection 0.3 mg     methylPREDNISolone sodium succinate injection 40 mg    methylPREDNISolone sodium succinate injection 40 mg    ondansetron disintegrating tablet 4 mg    ondansetron injection 4 mg    sodium chloride 0.9% 500 mL flush bag    sodium chloride 0.9% 500 mL flush bag    sodium chloride 0.9% flush 10 mL    sodium chloride 0.9% flush 10 mL       Review of patient's allergies indicates:   Allergen Reactions    Iodinated contrast media Itching and Other (See Comments)     Throat itching     Vancomycin analogues Itching    Sulfa (sulfonamide antibiotics) Itching    Codeine Nausea Only    Avelox [moxifloxacin] Palpitations    Pcn [penicillins] Other (See Comments)     Bumps in mouth after taking for a while.        Family History   Problem Relation Name Age of Onset    Heart disease Mother      Alzheimer's disease Mother      Heart disease Father      Diabetes Father      Kidney disease Father      Breast cancer Maternal Aunt         Social History     Socioeconomic History    Marital status:     Number of children: 3   Occupational History    Occupation: , retired LPN   Tobacco Use    Smoking status: Former     Current packs/day: 0.00     Average packs/day: 2.0 packs/day for 30.0 years (60.0 ttl pk-yrs)     Types: Cigarettes     Start date: 1985     Quit date: 6/3/2005     Years since quittin.1    Smokeless tobacco: Never   Substance and Sexual Activity    Alcohol use: No    Drug use: No    Sexual activity: Never     Social Determinants of Health     Financial Resource Strain: Low Risk  (2020)    Received from [facility]    Overall Financial Resource Strain (CARDIA)     Difficulty of Paying Living Expenses: Not hard at all   Food Insecurity: No Food Insecurity (2020)    Received from [facility]    Hunger Vital Sign     Worried About Running Out of Food in the Last Year: Never true     Ran Out of Food in the Last Year: Never true   Transportation Needs: No Transportation Needs  (9/24/2020)    Received from [facility]    PRAPARE - Transportation     Lack of Transportation (Medical): No     Lack of Transportation (Non-Medical): No   Physical Activity: Insufficiently Active (9/24/2020)    Received from [facility]    Exercise Vital Sign     Days of Exercise per Week: 3 days     Minutes of Exercise per Session: 30 min   Stress: No Stress Concern Present (9/24/2020)    Received from [facility]    [employer] - Occupational Stress Questionnaire     Feeling of Stress : Not at all       History of present illness:  68-year-old female, presents to clinic today for evaluation of complaints of right hip pain.  Proximally 2 months duration insidious onset right hip pain.  Pain is in the right hip and buttocks and does occasionally radiate down the right lower extremity.  She denies back pain however.  She denies groin pain.      Review of Systems:    Constitution: Negative for chills, fever, and sweats.  Negative for unexplained weight loss.    HENT:  Negative for headaches and blurry vision.    Cardiovascular:Negative for chest pain or irregular heart beat. Negative for hypertension.    Respiratory:  Negative for cough and shortness of breath.    Gastrointestinal: Negative for abdominal pain, heartburn, melena, nausea, and vomitting.    Genitourinary:  Negative bladder incontinence and dysuria.    Musculoskeletal:  See HPI for details.     Neurological: Negative for numbness.    Psychiatric/Behavioral: Negative for depression.  The patient is not nervous/anxious.      Endocrine: Negative for polyuria    Hematologic/Lymphatic: Negative for bleeding problem.  Does not bruise/bleed easily.    Skin: Negative for poor would healing and rash    Objective:      Physical Examination:    Vital Signs:  There were no vitals filed for this visit.    Body mass index is 30.11 kg/m².    This a well-developed, well nourished patient in no acute distress.  They are alert and oriented  "and cooperative to examination.        Examination of the lumbar spine, no midline vertebral tenderness, she has got some right-sided pair vertebral tenderness and tenderness in the right hip and buttocks.  Nontender over the greater trochanter.  No pain with range of motion of the right hip no groin pain with vigorous range motion of the right hip.  Homans sign is negative, straight leg raise is negative.    Pertinent New Results:    XRAY Report / Interpretation:   AP and lateral views of the lumbar spine taken today in the office demonstrate some lumbar spondylosis with mild degenerative disc space narrowing.  She has got facet arthritis however L4-S1.    AP pelvis taken today in the office, she has got degenerative changes in the bilateral hips.  Narrowing of the femoral acetabular spaces.  No bone-on-bone deformities however.    Assessment/Plan:        Trochanteric bursitis right hip, labral degeneration, lumbar spondylosis.  We injected the right hip today for the trochanteric bursitis, lidocaine and triamcinolone sterile technique patient tolerated well.  We will put her in some physical therapy for stretching and core strengthening.  We will see how she responds to that treatment.  If her hip pain returns or continues to bother her consider a MRI of the right hip.  She had labral degeneration on the MRI for left hip from last year.  Left hip doing well.    Jamie Owens, Physician Assistant, served in the capacity as a "scribe" for this patient encounter.  A "face-to-face" encounter occurred with Dr. Yony Huerta on this date.  The treatment plan and medical decision-making is outlined above. Patient was seen and examined with a chaperone.       This note was created using Dragon voice recognition software that occasionally misinterpreted phrases or words.          "

## 2024-10-31 ENCOUNTER — HOSPITAL ENCOUNTER (EMERGENCY)
Facility: HOSPITAL | Age: 69
Discharge: HOME | End: 2024-10-31
Attending: EMERGENCY MEDICINE
Payer: MEDICARE

## 2024-10-31 VITALS
HEIGHT: 62 IN | RESPIRATION RATE: 18 BRPM | SYSTOLIC BLOOD PRESSURE: 160 MMHG | BODY MASS INDEX: 32.2 KG/M2 | OXYGEN SATURATION: 96 % | WEIGHT: 175 LBS | HEART RATE: 54 BPM | TEMPERATURE: 98 F | DIASTOLIC BLOOD PRESSURE: 79 MMHG
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
## 2024-10-31 DIAGNOSIS — R52 PAIN: ICD-10-CM

## 2024-10-31 DIAGNOSIS — S80.12XA CONTUSION OF LEFT LOWER EXTREMITY, INITIAL ENCOUNTER: Primary | ICD-10-CM

## 2024-10-31 PROCEDURE — 99284 EMERGENCY DEPT VISIT MOD MDM: CPT | Mod: 25

## 2024-10-31 NOTE — ED PROVIDER NOTES
Encounter Date: 10/31/2024       History     Chief Complaint   Patient presents with    Fall     Pt had a fall one week ago and now has discoloration to left leg.      69-year-old female with a past medical history of CHF, CAD, diabetes, mechanical valve currently on Coumadin last INR was 2 days ago and 3.4 patient was scheduled to check her INR tomorrow.  The patient reports that she had a mechanical fall 1 week ago sustained a hematoma to the proximal tibia she reports that she is able to ambulate and feels fine from the fall however yesterday noted discoloration now to the mid tibia and pain behind her knee which she is concerned about because she is scheduled to go out of town and she needs to take a flight on Saturday.  The patient's saw her cardiologist yesterday Dr. Montemayor she is currently on Augmentin, and steroids .  Patient states that she was sent here for further evaluation        Review of patient's allergies indicates:   Allergen Reactions    Iodinated contrast media Itching and Other (See Comments)     Throat itching     Vancomycin analogues Itching    Sulfa (sulfonamide antibiotics) Itching    Codeine Nausea Only    Avelox [moxifloxacin] Palpitations    Pcn [penicillins] Other (See Comments)     Bumps in mouth after taking for a while.      Past Medical History:   Diagnosis Date    Anemia     Breast cancer 2014    left    CHF (congestive heart failure)     Coarctation of aorta     Coronary artery disease     Diastolic heart failure     DM (diabetes mellitus)     GERD (gastroesophageal reflux disease)     Hyperlipidemia     ARNAUD (obstructive sleep apnea)     Pickwickian syndrome     Pneumonia      Past Surgical History:   Procedure Laterality Date    AORTIC VALVE REPLACEMENT      BREAST LUMPECTOMY Left 2014    BREAST SURGERY      CARDIAC SURGERY      CHOLECYSTECTOMY      CORONARY ARTERY BYPASS GRAFT      x1 vessel    EYE SURGERY      HYSTERECTOMY      KNEE CARTILAGE SURGERY       Family History    Problem Relation Name Age of Onset    Heart disease Mother      Alzheimer's disease Mother      Heart disease Father      Diabetes Father      Kidney disease Father      Breast cancer Maternal Aunt       Social History     Tobacco Use    Smoking status: Former     Current packs/day: 0.00     Average packs/day: 2.0 packs/day for 30.0 years (60.0 ttl pk-yrs)     Types: Cigarettes     Start date: 1985     Quit date: 6/3/2005     Years since quittin.4    Smokeless tobacco: Never   Substance Use Topics    Alcohol use: No    Drug use: No     Review of Systems   Constitutional: Negative.    Musculoskeletal:         Tenderness to left anterior shin with bruising   All other systems reviewed and are negative.      Physical Exam     Initial Vitals [10/31/24 1049]   BP Pulse Resp Temp SpO2   (!) 153/60 (!) 59 18 98.5 °F (36.9 °C) 98 %      MAP       --         Physical Exam    Nursing note and vitals reviewed.  Constitutional: She appears well-developed and well-nourished.   Musculoskeletal:      Comments: Patient has old area of bruising to the proximal tibia, she has old stages of bruising to the mid tib-fib Homans sign distal pulses are intact.     Neurological: She is alert and oriented to person, place, and time. She has normal strength.         ED Course   Procedures  Labs Reviewed - No data to display       Imaging Results              US Lower Extremity Veins Left (Final result)  Result time 10/31/24 13:08:05      Final result by Janice Aquino IV, MD (10/31/24 13:08:05)                   Impression:      No findings of deep venous thrombosis involving the left lower extremity.      Electronically signed by: Janice Aquino  Date:    10/31/2024  Time:    13:08               Narrative:    EXAMINATION:  US LOWER EXTREMITY VEINS LEFT    CLINICAL HISTORY:  Pain, unspecified    COMPARISON:  None.    FINDINGS:  Real-time, duplex and color Doppler interrogation of the left lower extremity deep venous system is  performed.  This demonstrates patency of the common and superficial femoral veins, greater saphenous vein, popliteal vein and major calf veins. There are no findings of deep vein thrombosis.                                       X-Ray Tibia Fibula 2 View Left (Final result)  Result time 10/31/24 12:31:57      Final result by Nael Ruiz MD (10/31/24 12:31:57)                   Impression:      Normal left tibia and fibula.      Electronically signed by: Nael Ruiz  Date:    10/31/2024  Time:    12:31               Narrative:    EXAMINATION:  XR TIBIA FIBULA 2 VIEW LEFT    CLINICAL HISTORY:  .  Unspecified fall, initial encounter    COMPARISON:  None.    FINDINGS:  AP and lateral views are negative for fracture or osseous destructive lesion. There is no periosteal reaction. Soft tissues are normal.                                       X-Ray Knee Complete 4 or more Views Left (Final result)  Result time 10/31/24 12:32:27   Procedure changed from X-Ray Knee 3 View Left     Final result by Nael Ruiz MD (10/31/24 12:32:27)                   Impression:      Normal left knee.      Electronically signed by: Nael Ruiz  Date:    10/31/2024  Time:    12:32               Narrative:    EXAMINATION:  XR KNEE COMP 4 OR MORE VIEWS LEFT    CLINICAL HISTORY:  pain;pain;.  Unspecified fall, initial encounter    COMPARISON:  None.    FINDINGS:  4 views are negative for fracture, dislocation, or osseous destructive lesion. No significant arthritic change or joint effusion is identified.                                       Medications - No data to display  Medical Decision Making  69-year-old female with a past medical history of CHF, CAD, diabetes, mechanical valve currently on Coumadin last INR was 2 days ago and 3.4 patient was scheduled to check her INR tomorrow.  The patient reports that she had a mechanical fall 1 week ago sustained a hematoma to the proximal tibia she reports that she is able  to ambulate and feels fine from the fall however yesterday noted discoloration now to the mid tibia and pain behind her knee which she is concerned about because she is scheduled to go out of town and she needs to take a flight on Saturday.  The patient's saw her cardiologist yesterday Dr. Montemayor she is currently on Augmentin, and steroids .  Patient states that she was sent here for further evaluation      Considerations include but not limited to, fracture, contusion, hematoma, DVT    69-year-old female presents emergency department reports she had a mechanical fall 2 days ago she is on Coumadin and she sustained a hematoma to the proximal tib-fib area she is concerned because now the bruising has moved down her leg she states she is going on a trip soon out of the country and needs to fly in his concerned about a possible DVT despite the fact that she is on Coumadin.  Patient's last INR was 3.4 which is 2 days ago she is scheduled to get her INR checked tomorrow.  She has no further complaints.  X-ray imaging of the tibia and left knee reveal no acute fractures ultrasound performed of the left lower extremity with no evidence of DVT.  Patient will be instructed to elevate extremity take Tylenol for pain given return precautions.    Amount and/or Complexity of Data Reviewed  Radiology: ordered. Decision-making details documented in ED Course.                                      Clinical Impression:  Final diagnoses:  [W19.XXXA] Fall  [R52] Pain  [S80.12XA] Contusion of left lower extremity, initial encounter (Primary)          ED Disposition Condition    Discharge Stable          ED Prescriptions    None       Follow-up Information       Follow up With Specialties Details Why Contact Info    Quinn Vivas MD Internal Medicine Schedule an appointment as soon as possible for a visit in 1 week  [address] [place] [address] [id number]  [facility] [id number]  [phone]               Rosemary Parra FNP  10/31/24  [id number]

## 2025-07-03 DIAGNOSIS — Z78.0 ASYMPTOMATIC POSTMENOPAUSAL STATUS: ICD-10-CM

## 2025-07-03 DIAGNOSIS — Z12.31 ENCOUNTER FOR SCREENING MAMMOGRAM FOR MALIGNANT NEOPLASM OF BREAST: Primary | ICD-10-CM

## 2025-07-09 ENCOUNTER — HOSPITAL ENCOUNTER (OUTPATIENT)
Dept: RADIOLOGY | Facility: HOSPITAL | Age: 70
Discharge: HOME | End: 2025-07-09
Attending: INTERNAL MEDICINE
Payer: MEDICARE

## 2025-07-09 DIAGNOSIS — Z12.31 ENCOUNTER FOR SCREENING MAMMOGRAM FOR MALIGNANT NEOPLASM OF BREAST: ICD-10-CM

## 2025-07-09 PROCEDURE — 77067 SCR MAMMO BI INCL CAD: CPT | Mod: 26,,, | Performed by: RADIOLOGY

## 2025-07-09 PROCEDURE — 77063 BREAST TOMOSYNTHESIS BI: CPT | Mod: TC,PO

## 2025-07-09 PROCEDURE — 77063 BREAST TOMOSYNTHESIS BI: CPT | Mod: 26,,, | Performed by: RADIOLOGY
